# Patient Record
Sex: MALE | Race: WHITE
[De-identification: names, ages, dates, MRNs, and addresses within clinical notes are randomized per-mention and may not be internally consistent; named-entity substitution may affect disease eponyms.]

---

## 2017-01-25 ENCOUNTER — HOSPITAL ENCOUNTER (EMERGENCY)
Dept: HOSPITAL 71 - ER | Age: 32
Discharge: HOME | End: 2017-01-25
Payer: SELF-PAY

## 2017-01-25 DIAGNOSIS — F17.210: ICD-10-CM

## 2017-01-25 DIAGNOSIS — R10.9: Primary | ICD-10-CM

## 2017-01-25 PROCEDURE — 81003 URINALYSIS AUTO W/O SCOPE: CPT

## 2017-01-25 PROCEDURE — 80053 COMPREHEN METABOLIC PANEL: CPT

## 2017-01-25 PROCEDURE — 83690 ASSAY OF LIPASE: CPT

## 2017-01-25 PROCEDURE — 96361 HYDRATE IV INFUSION ADD-ON: CPT

## 2017-01-25 PROCEDURE — 96375 TX/PRO/DX INJ NEW DRUG ADDON: CPT

## 2017-01-25 PROCEDURE — 74177 CT ABD & PELVIS W/CONTRAST: CPT

## 2017-01-25 PROCEDURE — 36415 COLL VENOUS BLD VENIPUNCTURE: CPT

## 2017-01-25 PROCEDURE — 96376 TX/PRO/DX INJ SAME DRUG ADON: CPT

## 2017-01-25 PROCEDURE — 85025 COMPLETE CBC W/AUTO DIFF WBC: CPT

## 2017-01-25 PROCEDURE — 96374 THER/PROPH/DIAG INJ IV PUSH: CPT

## 2019-09-08 ENCOUNTER — HOSPITAL ENCOUNTER (OUTPATIENT)
Dept: URGENT CARE | Facility: CLINIC | Age: 34
Discharge: HOME | End: 2019-09-08
Attending: EMERGENCY MEDICINE

## 2022-03-19 ENCOUNTER — APPOINTMENT (OUTPATIENT)
Dept: CT IMAGING | Facility: HOSPITAL | Age: 37
End: 2022-03-19

## 2022-03-19 ENCOUNTER — HOSPITAL ENCOUNTER (EMERGENCY)
Facility: HOSPITAL | Age: 37
Discharge: HOME | End: 2022-03-19
Attending: EMERGENCY MEDICINE | Admitting: EMERGENCY MEDICINE

## 2022-03-19 VITALS
HEIGHT: 67 IN | BODY MASS INDEX: 34.53 KG/M2 | OXYGEN SATURATION: 96 % | TEMPERATURE: 97.9 F | DIASTOLIC BLOOD PRESSURE: 73 MMHG | SYSTOLIC BLOOD PRESSURE: 109 MMHG | WEIGHT: 220 LBS | RESPIRATION RATE: 18 BRPM | HEART RATE: 73 BPM

## 2022-03-19 DIAGNOSIS — L03.311 ABDOMINAL WALL CELLULITIS: Primary | ICD-10-CM

## 2022-03-19 LAB
ALBUMIN SERPL-MCNC: 4.9 G/DL (ref 3.5–5.2)
ALBUMIN/GLOB SERPL: 1.8 G/DL
ALP SERPL-CCNC: 79 U/L (ref 39–117)
ALT SERPL W P-5'-P-CCNC: 27 U/L (ref 1–41)
ANION GAP SERPL CALCULATED.3IONS-SCNC: 10.5 MMOL/L (ref 5–15)
AST SERPL-CCNC: 23 U/L (ref 1–40)
BASOPHILS # BLD AUTO: 0.04 10*3/MM3 (ref 0–0.2)
BASOPHILS NFR BLD AUTO: 0.4 % (ref 0–1.5)
BILIRUB SERPL-MCNC: 0.4 MG/DL (ref 0–1.2)
BILIRUB UR QL STRIP: NEGATIVE
BUN SERPL-MCNC: 11 MG/DL (ref 6–20)
BUN/CREAT SERPL: 11.5 (ref 7–25)
CALCIUM SPEC-SCNC: 9.4 MG/DL (ref 8.6–10.5)
CHLORIDE SERPL-SCNC: 103 MMOL/L (ref 98–107)
CLARITY UR: CLEAR
CO2 SERPL-SCNC: 26.5 MMOL/L (ref 22–29)
COLOR UR: YELLOW
CREAT SERPL-MCNC: 0.96 MG/DL (ref 0.76–1.27)
DEPRECATED RDW RBC AUTO: 41.5 FL (ref 37–54)
EGFRCR SERPLBLD CKD-EPI 2021: 105.1 ML/MIN/1.73
EOSINOPHIL # BLD AUTO: 0.18 10*3/MM3 (ref 0–0.4)
EOSINOPHIL NFR BLD AUTO: 1.6 % (ref 0.3–6.2)
ERYTHROCYTE [DISTWIDTH] IN BLOOD BY AUTOMATED COUNT: 12.6 % (ref 12.3–15.4)
GLOBULIN UR ELPH-MCNC: 2.7 GM/DL
GLUCOSE SERPL-MCNC: 87 MG/DL (ref 65–99)
GLUCOSE UR STRIP-MCNC: NEGATIVE MG/DL
HCT VFR BLD AUTO: 45 % (ref 37.5–51)
HGB BLD-MCNC: 15.5 G/DL (ref 13–17.7)
HGB UR QL STRIP.AUTO: NEGATIVE
HOLD SPECIMEN: NORMAL
HOLD SPECIMEN: NORMAL
IMM GRANULOCYTES # BLD AUTO: 0.03 10*3/MM3 (ref 0–0.05)
IMM GRANULOCYTES NFR BLD AUTO: 0.3 % (ref 0–0.5)
KETONES UR QL STRIP: NEGATIVE
LEUKOCYTE ESTERASE UR QL STRIP.AUTO: NEGATIVE
LIPASE SERPL-CCNC: 91 U/L (ref 13–60)
LYMPHOCYTES # BLD AUTO: 2.76 10*3/MM3 (ref 0.7–3.1)
LYMPHOCYTES NFR BLD AUTO: 24.5 % (ref 19.6–45.3)
MCH RBC QN AUTO: 31 PG (ref 26.6–33)
MCHC RBC AUTO-ENTMCNC: 34.4 G/DL (ref 31.5–35.7)
MCV RBC AUTO: 90 FL (ref 79–97)
MONOCYTES # BLD AUTO: 0.85 10*3/MM3 (ref 0.1–0.9)
MONOCYTES NFR BLD AUTO: 7.5 % (ref 5–12)
NEUTROPHILS NFR BLD AUTO: 65.7 % (ref 42.7–76)
NEUTROPHILS NFR BLD AUTO: 7.41 10*3/MM3 (ref 1.7–7)
NITRITE UR QL STRIP: NEGATIVE
NRBC BLD AUTO-RTO: 0 /100 WBC (ref 0–0.2)
PH UR STRIP.AUTO: 6 [PH] (ref 5–8)
PLATELET # BLD AUTO: 326 10*3/MM3 (ref 140–450)
PMV BLD AUTO: 9.4 FL (ref 6–12)
POTASSIUM SERPL-SCNC: 3.6 MMOL/L (ref 3.5–5.2)
PROT SERPL-MCNC: 7.6 G/DL (ref 6–8.5)
PROT UR QL STRIP: NEGATIVE
RBC # BLD AUTO: 5 10*6/MM3 (ref 4.14–5.8)
SODIUM SERPL-SCNC: 140 MMOL/L (ref 136–145)
SP GR UR STRIP: 1.02 (ref 1–1.03)
UROBILINOGEN UR QL STRIP: NORMAL
WBC NRBC COR # BLD: 11.27 10*3/MM3 (ref 3.4–10.8)
WHOLE BLOOD HOLD SPECIMEN: NORMAL
WHOLE BLOOD HOLD SPECIMEN: NORMAL

## 2022-03-19 PROCEDURE — 85025 COMPLETE CBC W/AUTO DIFF WBC: CPT | Performed by: EMERGENCY MEDICINE

## 2022-03-19 PROCEDURE — 96365 THER/PROPH/DIAG IV INF INIT: CPT

## 2022-03-19 PROCEDURE — 25010000002 ONDANSETRON PER 1 MG: Performed by: REGISTERED NURSE

## 2022-03-19 PROCEDURE — 81003 URINALYSIS AUTO W/O SCOPE: CPT | Performed by: EMERGENCY MEDICINE

## 2022-03-19 PROCEDURE — 96375 TX/PRO/DX INJ NEW DRUG ADDON: CPT

## 2022-03-19 PROCEDURE — 36415 COLL VENOUS BLD VENIPUNCTURE: CPT | Performed by: EMERGENCY MEDICINE

## 2022-03-19 PROCEDURE — 74177 CT ABD & PELVIS W/CONTRAST: CPT

## 2022-03-19 PROCEDURE — 0 IOPAMIDOL PER 1 ML: Performed by: EMERGENCY MEDICINE

## 2022-03-19 PROCEDURE — 25010000002 KETOROLAC TROMETHAMINE PER 15 MG: Performed by: REGISTERED NURSE

## 2022-03-19 PROCEDURE — 83690 ASSAY OF LIPASE: CPT | Performed by: EMERGENCY MEDICINE

## 2022-03-19 PROCEDURE — 99283 EMERGENCY DEPT VISIT LOW MDM: CPT

## 2022-03-19 PROCEDURE — 80053 COMPREHEN METABOLIC PANEL: CPT | Performed by: EMERGENCY MEDICINE

## 2022-03-19 PROCEDURE — 25010000002 CEFTRIAXONE PER 250 MG: Performed by: REGISTERED NURSE

## 2022-03-19 RX ORDER — ONDANSETRON 2 MG/ML
4 INJECTION INTRAMUSCULAR; INTRAVENOUS ONCE
Status: COMPLETED | OUTPATIENT
Start: 2022-03-19 | End: 2022-03-19

## 2022-03-19 RX ORDER — SODIUM CHLORIDE 0.9 % (FLUSH) 0.9 %
10 SYRINGE (ML) INJECTION AS NEEDED
Status: DISCONTINUED | OUTPATIENT
Start: 2022-03-19 | End: 2022-03-19 | Stop reason: HOSPADM

## 2022-03-19 RX ORDER — CEPHALEXIN 500 MG/1
500 CAPSULE ORAL 4 TIMES DAILY
Qty: 40 CAPSULE | Refills: 0 | Status: SHIPPED | OUTPATIENT
Start: 2022-03-19 | End: 2022-03-29

## 2022-03-19 RX ORDER — KETOROLAC TROMETHAMINE 15 MG/ML
15 INJECTION, SOLUTION INTRAMUSCULAR; INTRAVENOUS ONCE
Status: COMPLETED | OUTPATIENT
Start: 2022-03-19 | End: 2022-03-19

## 2022-03-19 RX ORDER — ONDANSETRON 4 MG/1
4 TABLET, ORALLY DISINTEGRATING ORAL EVERY 8 HOURS PRN
Qty: 30 TABLET | Refills: 0 | Status: SHIPPED | OUTPATIENT
Start: 2022-03-19 | End: 2022-03-29

## 2022-03-19 RX ORDER — CEFTRIAXONE SODIUM 1 G/50ML
1 INJECTION, SOLUTION INTRAVENOUS ONCE
Status: COMPLETED | OUTPATIENT
Start: 2022-03-19 | End: 2022-03-19

## 2022-03-19 RX ADMIN — ONDANSETRON 4 MG: 2 INJECTION INTRAMUSCULAR; INTRAVENOUS at 16:55

## 2022-03-19 RX ADMIN — SODIUM CHLORIDE 1000 ML: 9 INJECTION, SOLUTION INTRAVENOUS at 16:28

## 2022-03-19 RX ADMIN — CEFTRIAXONE SODIUM 1 G: 1 INJECTION, SOLUTION INTRAVENOUS at 17:34

## 2022-03-19 RX ADMIN — KETOROLAC TROMETHAMINE 15 MG: 15 INJECTION, SOLUTION INTRAMUSCULAR; INTRAVENOUS at 16:29

## 2022-03-19 RX ADMIN — IOPAMIDOL 100 ML: 755 INJECTION, SOLUTION INTRAVENOUS at 16:46

## 2022-03-19 NOTE — ED PROVIDER NOTES
Time: 3:24 PM EDT  Arrived by: THA  Chief Complaint: Abdominal pain  History provided by: Patient    History of Present Illness:  Patient is a 36 y.o. year old male that presents to the emergency department with complaint of abdominal pain, redness, increased warmth, and a knot just above the umbilicus.  He states that started 2 days ago and has progressively worsened.  He denies nausea/vomiting, constipation/diarrhea or fever/chills.       used: No    Abdominal Pain  Pain location:  Periumbilical  Pain quality: sharp and throbbing    Pain radiates to:  Does not radiate  Pain severity:  Moderate  Onset quality:  Sudden  Duration:  2 days  Timing:  Constant  Progression:  Worsening  Chronicity:  New  Relieved by:  Nothing  Worsened by:  Nothing  Ineffective treatments:  None tried  Associated symptoms: no chest pain, no chills, no constipation, no cough, no diarrhea, no dysuria, no fever, no hematochezia, no hematuria, no nausea, no shortness of breath, no sore throat and no vomiting            Similar Symptoms Previously: No  Recently seen: No      Patient Care Team  Primary Care Provider: None listed    Past Medical History:     No Known Allergies  History reviewed. No pertinent past medical history.  Past Surgical History:   Procedure Laterality Date   • SPINE SURGERY       History reviewed. No pertinent family history.    Home Medications:  Prior to Admission medications    Not on File        Social History:   PT  reports that he has been smoking cigarettes. He has been smoking about 1.00 pack per day. He does not have any smokeless tobacco history on file. He reports previous alcohol use. No history on file for drug use.    Record Review:  I have reviewed the patient's records in [employer].     Review of Systems  Review of Systems   Constitutional: Negative for chills and fever.   HENT: Negative for congestion, ear pain and sore throat.    Eyes: Negative for pain.   Respiratory: Negative for  "cough, chest tightness and shortness of breath.    Cardiovascular: Negative for chest pain.   Gastrointestinal: Positive for abdominal pain. Negative for blood in stool, constipation, diarrhea, hematochezia, nausea and vomiting.   Genitourinary: Negative for dysuria, flank pain and hematuria.   Musculoskeletal: Negative for joint swelling.   Skin: Negative for pallor.   Neurological: Negative for seizures and headaches.   All other systems reviewed and are negative.       Physical Exam    /73 (BP Location: Right arm, Patient Position: Sitting)   Pulse 73   Temp 97.9 °F (36.6 °C) (Oral)   Resp 18   Ht 170.2 cm (67\")   Wt 99.8 kg (220 lb)   SpO2 96%   BMI 34.46 kg/m²     Physical Exam  Vitals and nursing note reviewed.   Constitutional:       General: He is not in acute distress.     Appearance: Normal appearance. He is obese. He is not toxic-appearing.   HENT:      Head: Normocephalic and atraumatic.      Mouth/Throat:      Mouth: Mucous membranes are moist.   Cardiovascular:      Rate and Rhythm: Normal rate and regular rhythm.      Pulses:           Radial pulses are 2+ on the right side and 2+ on the left side.      Heart sounds: Normal heart sounds.   Pulmonary:      Effort: Pulmonary effort is normal. No respiratory distress.      Breath sounds: Normal breath sounds.   Abdominal:      General: Abdomen is protuberant. Bowel sounds are normal.      Palpations: Abdomen is soft.      Tenderness: There is abdominal tenderness in the periumbilical area. There is no right CVA tenderness, left CVA tenderness, guarding or rebound.          Comments: Discrete erythema surrounding umbilicus, increased warmth, palpable painful lump superior to umbilicus   Musculoskeletal:         General: Normal range of motion.      Cervical back: Normal range of motion and neck supple.   Skin:     General: Skin is warm and dry.   Neurological:      Mental Status: He is alert and oriented to person, place, and time. Mental " "status is at baseline.                  ED Course  /73 (BP Location: Right arm, Patient Position: Sitting)   Pulse 73   Temp 97.9 °F (36.6 °C) (Oral)   Resp 18   Ht 170.2 cm (67\")   Wt 99.8 kg (220 lb)   SpO2 96%   BMI 34.46 kg/m²   Results for orders placed or performed during the hospital encounter of 03/19/22   Comprehensive Metabolic Panel    Specimen: Blood   Result Value Ref Range    Glucose 87 65 - 99 mg/dL    BUN 11 6 - 20 mg/dL    Creatinine 0.96 0.76 - 1.27 mg/dL    Sodium 140 136 - 145 mmol/L    Potassium 3.6 3.5 - 5.2 mmol/L    Chloride 103 98 - 107 mmol/L    CO2 26.5 22.0 - 29.0 mmol/L    Calcium 9.4 8.6 - 10.5 mg/dL    Total Protein 7.6 6.0 - 8.5 g/dL    Albumin 4.90 3.50 - 5.20 g/dL    ALT (SGPT) 27 1 - 41 U/L    AST (SGOT) 23 1 - 40 U/L    Alkaline Phosphatase 79 39 - 117 U/L    Total Bilirubin 0.4 0.0 - 1.2 mg/dL    Globulin 2.7 gm/dL    A/G Ratio 1.8 g/dL    BUN/Creatinine Ratio 11.5 7.0 - 25.0    Anion Gap 10.5 5.0 - 15.0 mmol/L    eGFR 105.1 >60.0 mL/min/1.73   Lipase    Specimen: Blood   Result Value Ref Range    Lipase 91 (H) 13 - 60 U/L   Urinalysis With Microscopic If Indicated (No Culture) - Urine, Clean Catch    Specimen: Urine, Clean Catch   Result Value Ref Range    Color, UA Yellow Yellow, Straw    Appearance, UA Clear Clear    pH, UA 6.0 5.0 - 8.0    Specific Gravity, UA 1.017 1.005 - 1.030    Glucose, UA Negative Negative    Ketones, UA Negative Negative    Bilirubin, UA Negative Negative    Blood, UA Negative Negative    Protein, UA Negative Negative    Leuk Esterase, UA Negative Negative    Nitrite, UA Negative Negative    Urobilinogen, UA 1.0 E.U./dL 0.2 - 1.0 E.U./dL   CBC Auto Differential    Specimen: Blood   Result Value Ref Range    WBC 11.27 (H) 3.40 - 10.80 10*3/mm3    RBC 5.00 4.14 - 5.80 10*6/mm3    Hemoglobin 15.5 13.0 - 17.7 g/dL    Hematocrit 45.0 37.5 - 51.0 %    MCV 90.0 79.0 - 97.0 fL    MCH 31.0 26.6 - 33.0 pg    MCHC 34.4 31.5 - 35.7 g/dL    RDW 12.6 " 12.3 - 15.4 %    RDW-SD 41.5 37.0 - 54.0 fl    MPV 9.4 6.0 - 12.0 fL    Platelets 326 140 - 450 10*3/mm3    Neutrophil % 65.7 42.7 - 76.0 %    Lymphocyte % 24.5 19.6 - 45.3 %    Monocyte % 7.5 5.0 - 12.0 %    Eosinophil % 1.6 0.3 - 6.2 %    Basophil % 0.4 0.0 - 1.5 %    Immature Grans % 0.3 0.0 - 0.5 %    Neutrophils, Absolute 7.41 (H) 1.70 - 7.00 10*3/mm3    Lymphocytes, Absolute 2.76 0.70 - 3.10 10*3/mm3    Monocytes, Absolute 0.85 0.10 - 0.90 10*3/mm3    Eosinophils, Absolute 0.18 0.00 - 0.40 10*3/mm3    Basophils, Absolute 0.04 0.00 - 0.20 10*3/mm3    Immature Grans, Absolute 0.03 0.00 - 0.05 10*3/mm3    nRBC 0.0 0.0 - 0.2 /100 WBC   Green Top (Gel)   Result Value Ref Range    Extra Tube Hold for add-ons.    Lavender Top   Result Value Ref Range    Extra Tube hold for add-on    Gold Top - SST   Result Value Ref Range    Extra Tube Hold for add-ons.    Light Blue Top   Result Value Ref Range    Extra Tube hold for add-on      Medications   sodium chloride 0.9 % bolus 1,000 mL (0 mL Intravenous Stopped 3/19/22 1801)   ketorolac (TORADOL) injection 15 mg (15 mg Intravenous Given 3/19/22 1629)   ondansetron (ZOFRAN) injection 4 mg (4 mg Intravenous Given 3/19/22 1655)   iopamidol (ISOVUE-370) 76 % injection 100 mL (100 mL Intravenous Given 3/19/22 1646)   cefTRIAXone (ROCEPHIN) IVPB 1 g (0 g Intravenous Stopped 3/19/22 1804)     CT Abdomen Pelvis With Contrast    Result Date: 3/19/2022  Narrative: PROCEDURE: CT ABDOMEN PELVIS W CONTRAST  COMPARISON: [facility], CT, ABDOMEN/PELVIS WITH CONTRAST, 1/25/2017, 13:12.  INDICATIONS: periumbilical pain, redness, knot  TECHNIQUE: After obtaining the patient's consent, CT images were created with non-ionic intravenous contrast material.   PROTOCOL:   Standard imaging protocol performed    RADIATION:   DLP: 835.8mGy*cm   Automated exposure control was utilized to minimize radiation dose. CONTRAST: 93cc Isovue 370 I.V. LABS:   eGFR: >60ml/min/1.73m2  FINDINGS:   Abdomen:  Clues lung bases are clear.  Liver, spleen, kidneys, adrenal glands, pancreas gallbladder unremarkable.  Bowel loops are nondilated.  Normal appendix.  Skin thickening in the umbilical region.  No drainable fluid collection.  Pelvis:  No pelvic mass or fluid.  No aggressive appearing bone change.      Impression:  Localized skin thickening and enhancement in the umbilical region suggesting cellulitis.  No drainable fluid collection     SALENA WILLAMS MD       Electronically Signed and Approved By: SALENA WILLAMS MD on 3/19/2022 at 17:11               Procedures/EKGs:  Procedures    Medical Decision Making:                     MDM  Number of Diagnoses or Management Options  Abdominal wall cellulitis: new and requires workup  Diagnosis management comments: The patient´s CBC was reviewed and shows no abnormalities of critical concern. Of note, there is no anemia requiring a blood transfusion and the platelet count is acceptable.  White blood cell count was mildly elevated.The patient´s CMP was reviewed and shows no abnormalities of critical concern. Of note, the patient´s sodium and potassium are acceptable. The patient´s liver enzymes are unremarkable. The patient´s renal function (creatinine) is preserved. The patient has a normal anion gap.  CT scan showed cellulitis of abdominal wall surrounding umbilicus, no definitive abscess was identified.  Patient was given IV antibiotics in the ED and prescription for oral antibiotics was sent to his pharmacy.     I have spoken with the patient and explained the patient´s condition, diagnoses and treatment plan based on the information available to me at this time. I have answered the patient's questions and addressed any concerns. The patient has a good  understanding of the diagnosis, condition, and treatment plan as can be expected at this point. The vital signs have been stable. The patient´s condition is stable and appropriate for discharge from the emergency  department.      The patient will pursue further outpatient evaluation with the primary care physician or other designated or consulting physician as outlined in the discharge instructions. They are agreeable to this plan of care and follow-up instructions have been explained in detail. The patient has received these instructions in written format and has expressed an understanding of the discharge instructions. The patient is aware that any significant change in condition or worsening of symptoms should prompt an immediate return to this or the closest emergency department or call to 911.       Amount and/or Complexity of Data Reviewed  Clinical lab tests: ordered and reviewed  Tests in the radiology section of CPT®: ordered and reviewed    Risk of Complications, Morbidity, and/or Mortality  Presenting problems: minimal  Diagnostic procedures: minimal  Management options: minimal    Patient Progress  Patient progress: stable       Final diagnoses:   Abdominal wall cellulitis        Disposition:  ED Disposition     ED Disposition   Discharge    Condition   Stable    Comment   --              Jacinta Gamboa, APRFARHEEN  03/19/22 3258

## 2022-03-19 NOTE — DISCHARGE INSTRUCTIONS
Your CAT scan showed cellulitis of the abdominal wall, there was no abscess visible at this time.  Please monitor the area of concern for development of an abscess.  Take antibiotics as prescribed.  You can take anti-inflammatories as well to reduce inflammation of the area.    Return for worsening symptoms such as development of an abscess that is drainable, fever, worsening redness, swelling, or any other concerning symptoms.

## 2022-07-06 ENCOUNTER — APPOINTMENT (OUTPATIENT)
Dept: GENERAL RADIOLOGY | Facility: HOSPITAL | Age: 37
End: 2022-07-06

## 2022-07-06 ENCOUNTER — HOSPITAL ENCOUNTER (EMERGENCY)
Facility: HOSPITAL | Age: 37
Discharge: HOME | End: 2022-07-06
Attending: EMERGENCY MEDICINE | Admitting: EMERGENCY MEDICINE

## 2022-07-06 VITALS
OXYGEN SATURATION: 98 % | HEART RATE: 91 BPM | BODY MASS INDEX: 37.27 KG/M2 | DIASTOLIC BLOOD PRESSURE: 88 MMHG | HEIGHT: 67 IN | RESPIRATION RATE: 18 BRPM | SYSTOLIC BLOOD PRESSURE: 129 MMHG | WEIGHT: 237.44 LBS | TEMPERATURE: 98 F

## 2022-07-06 DIAGNOSIS — S90.32XA CONTUSION OF LEFT FOOT, INITIAL ENCOUNTER: Primary | ICD-10-CM

## 2022-07-06 PROCEDURE — 73630 X-RAY EXAM OF FOOT: CPT

## 2022-07-06 PROCEDURE — 99283 EMERGENCY DEPT VISIT LOW MDM: CPT

## 2022-07-06 RX ORDER — IBUPROFEN 400 MG/1
800 TABLET ORAL ONCE
Status: COMPLETED | OUTPATIENT
Start: 2022-07-06 | End: 2022-07-06

## 2022-07-06 RX ADMIN — IBUPROFEN 800 MG: 400 TABLET, FILM COATED ORAL at 11:22

## 2022-07-06 NOTE — ED PROVIDER NOTES
Subjective   History of Present Illness  Patient is a 36-year-old male presents today for left foot injury that occurred when he dropped a stack of pallets on his foot this morning while at work.  He complains of significant pain and bruising.  He has not had any medications for his pain.  He denies any other injuries from this incident.  Review of Systems  I performed a 10 point review of systems which was all negative, except for the positives found in the HPI above.  History reviewed. No pertinent past medical history.    No Known Allergies    Past Surgical History:   Procedure Laterality Date   • SPINE SURGERY         History reviewed. No pertinent family history.    Social History     Socioeconomic History   • Marital status: Single   Tobacco Use   • Smoking status: Current Every Day Smoker     Packs/day: 1.00     Types: Cigarettes   • Smokeless tobacco: Never Used   Substance and Sexual Activity   • Alcohol use: Not Currently     Comment: social   • Drug use: Never   • Sexual activity: Defer           Objective   Physical Exam    General: Awake alert and in no acute distress     HEENT: Head normocephalic atraumatic, eyes PERRLA EOMI, nose normal, oropharynx normal.     Neck: Supple full range of motion, no meningismus, no lymphadenopathy     Heart: Regular rate and rhythm, no murmurs or rubs, 2+ radial pulses bilaterally     Lungs: Clear to auscultation bilaterally without wheezes or crackles, no respiratory distress     Back exam:  No L-spine tenderness.  No rash.     Skin: Warm, dry, no rash     Musculoskeletal: Normal range of motion, no lower extremity edema, ecchymosis noted to lateral side of left foot extending to base of small toe with mild swelling, 2+ DP pulses bilaterally     Neurologic: Oriented x3, no motor deficits no sensory deficits     Psychiatric: Mood appears stable, no psychosis  Procedures           ED Course                                           MDM  Number of Diagnoses or Management  Options  Contusion of left foot, initial encounter  Diagnosis management comments: No acute findings on x-ray.  Patient was fitted with an Ace wrap and postop shoe and advised to follow-up with work well ASAP. I do not believe that the patient has an acute emergency medical condition requiring additional emergency management at this time. The patient is currently stable for outpatient treatment and continuation of care. Important signs and symptoms that would warrant return to the emergency department were reviewed. The patient was provided the opportunity to ask questions. All questions were addressed and the patient was discharged from the ED. The patient demonstrated understanding and agreed to plan       Amount and/or Complexity of Data Reviewed  Tests in the radiology section of CPT®: reviewed and ordered    Risk of Complications, Morbidity, and/or Mortality  Presenting problems: minimal  Diagnostic procedures: minimal  Management options: minimal    Patient Progress  Patient progress: stable      Final diagnoses:   Contusion of left foot, initial encounter       ED Disposition  ED Disposition     ED Disposition   Discharge    Condition   Stable    Comment   --             Provider, No Known  [facility] [id number]    Call   As needed    [facility]  [address]  [facility] [address] [phone]  Call   Call today for follow-up appointment         Medication List      No changes were made to your prescriptions during this visit.          Jacinta Gamboa, APRN  07/06/22 9148

## 2022-07-06 NOTE — DISCHARGE INSTRUCTIONS
Elevate your foot.  Apply ice several times per day.  Take Tylenol and/or ibuprofen as needed for pain.    Call work well today to arrange for follow-up appointment.    Return for worsening symptoms or new concerns.

## 2023-04-21 ENCOUNTER — HOSPITAL ENCOUNTER (EMERGENCY)
Facility: HOSPITAL | Age: 38
Discharge: HOME | End: 2023-04-21
Attending: EMERGENCY MEDICINE
Payer: COMMERCIAL

## 2023-04-21 ENCOUNTER — APPOINTMENT (OUTPATIENT)
Dept: CT IMAGING | Facility: HOSPITAL | Age: 38
End: 2023-04-21
Payer: COMMERCIAL

## 2023-04-21 VITALS
HEART RATE: 80 BPM | WEIGHT: 239.42 LBS | HEIGHT: 67 IN | SYSTOLIC BLOOD PRESSURE: 111 MMHG | OXYGEN SATURATION: 94 % | BODY MASS INDEX: 37.58 KG/M2 | RESPIRATION RATE: 18 BRPM | DIASTOLIC BLOOD PRESSURE: 83 MMHG | TEMPERATURE: 98 F

## 2023-04-21 DIAGNOSIS — T81.41XA INFECTION OF SUPERFICIAL INCISIONAL SURGICAL SITE AFTER PROCEDURE, INITIAL ENCOUNTER: Primary | ICD-10-CM

## 2023-04-21 LAB
ALBUMIN SERPL-MCNC: 4.4 G/DL (ref 3.5–5.2)
ALBUMIN/GLOB SERPL: 1.5 G/DL
ALP SERPL-CCNC: 80 U/L (ref 39–117)
ALT SERPL W P-5'-P-CCNC: 21 U/L (ref 1–41)
ANION GAP SERPL CALCULATED.3IONS-SCNC: 11.2 MMOL/L (ref 5–15)
AST SERPL-CCNC: 19 U/L (ref 1–40)
BASOPHILS # BLD AUTO: 0.03 10*3/MM3 (ref 0–0.2)
BASOPHILS NFR BLD AUTO: 0.3 % (ref 0–1.5)
BILIRUB SERPL-MCNC: 0.4 MG/DL (ref 0–1.2)
BILIRUB UR QL STRIP: NEGATIVE
BUN SERPL-MCNC: 9 MG/DL (ref 6–20)
BUN/CREAT SERPL: 11.1 (ref 7–25)
CALCIUM SPEC-SCNC: 8.9 MG/DL (ref 8.6–10.5)
CHLORIDE SERPL-SCNC: 103 MMOL/L (ref 98–107)
CLARITY UR: CLEAR
CO2 SERPL-SCNC: 21.8 MMOL/L (ref 22–29)
COLOR UR: YELLOW
CREAT SERPL-MCNC: 0.81 MG/DL (ref 0.76–1.27)
DEPRECATED RDW RBC AUTO: 40.3 FL (ref 37–54)
EGFRCR SERPLBLD CKD-EPI 2021: 116.5 ML/MIN/1.73
EOSINOPHIL # BLD AUTO: 0.2 10*3/MM3 (ref 0–0.4)
EOSINOPHIL NFR BLD AUTO: 1.7 % (ref 0.3–6.2)
ERYTHROCYTE [DISTWIDTH] IN BLOOD BY AUTOMATED COUNT: 12.4 % (ref 12.3–15.4)
GLOBULIN UR ELPH-MCNC: 3 GM/DL
GLUCOSE SERPL-MCNC: 103 MG/DL (ref 65–99)
GLUCOSE UR STRIP-MCNC: NEGATIVE MG/DL
HCT VFR BLD AUTO: 41 % (ref 37.5–51)
HGB BLD-MCNC: 14.7 G/DL (ref 13–17.7)
HGB UR QL STRIP.AUTO: NEGATIVE
HOLD SPECIMEN: NORMAL
HOLD SPECIMEN: NORMAL
IMM GRANULOCYTES # BLD AUTO: 0.03 10*3/MM3 (ref 0–0.05)
IMM GRANULOCYTES NFR BLD AUTO: 0.3 % (ref 0–0.5)
KETONES UR QL STRIP: NEGATIVE
LEUKOCYTE ESTERASE UR QL STRIP.AUTO: NEGATIVE
LIPASE SERPL-CCNC: 103 U/L (ref 13–60)
LYMPHOCYTES # BLD AUTO: 2.09 10*3/MM3 (ref 0.7–3.1)
LYMPHOCYTES NFR BLD AUTO: 18.1 % (ref 19.6–45.3)
MCH RBC QN AUTO: 31.5 PG (ref 26.6–33)
MCHC RBC AUTO-ENTMCNC: 35.9 G/DL (ref 31.5–35.7)
MCV RBC AUTO: 88 FL (ref 79–97)
MONOCYTES # BLD AUTO: 0.94 10*3/MM3 (ref 0.1–0.9)
MONOCYTES NFR BLD AUTO: 8.2 % (ref 5–12)
NEUTROPHILS NFR BLD AUTO: 71.4 % (ref 42.7–76)
NEUTROPHILS NFR BLD AUTO: 8.23 10*3/MM3 (ref 1.7–7)
NITRITE UR QL STRIP: NEGATIVE
NRBC BLD AUTO-RTO: 0 /100 WBC (ref 0–0.2)
PH UR STRIP.AUTO: 7.5 [PH] (ref 5–8)
PLATELET # BLD AUTO: 294 10*3/MM3 (ref 140–450)
PMV BLD AUTO: 9.5 FL (ref 6–12)
POTASSIUM SERPL-SCNC: 3.7 MMOL/L (ref 3.5–5.2)
PROT SERPL-MCNC: 7.4 G/DL (ref 6–8.5)
PROT UR QL STRIP: NEGATIVE
RBC # BLD AUTO: 4.66 10*6/MM3 (ref 4.14–5.8)
SODIUM SERPL-SCNC: 136 MMOL/L (ref 136–145)
SP GR UR STRIP: 1.01 (ref 1–1.03)
UROBILINOGEN UR QL STRIP: NORMAL
WBC NRBC COR # BLD: 11.52 10*3/MM3 (ref 3.4–10.8)
WHOLE BLOOD HOLD COAG: NORMAL
WHOLE BLOOD HOLD SPECIMEN: NORMAL

## 2023-04-21 PROCEDURE — 81003 URINALYSIS AUTO W/O SCOPE: CPT | Performed by: EMERGENCY MEDICINE

## 2023-04-21 PROCEDURE — 36415 COLL VENOUS BLD VENIPUNCTURE: CPT

## 2023-04-21 PROCEDURE — 83690 ASSAY OF LIPASE: CPT

## 2023-04-21 PROCEDURE — 99282 EMERGENCY DEPT VISIT SF MDM: CPT

## 2023-04-21 PROCEDURE — 96374 THER/PROPH/DIAG INJ IV PUSH: CPT

## 2023-04-21 PROCEDURE — 80053 COMPREHEN METABOLIC PANEL: CPT

## 2023-04-21 PROCEDURE — 85025 COMPLETE CBC W/AUTO DIFF WBC: CPT

## 2023-04-21 PROCEDURE — 87070 CULTURE OTHR SPECIMN AEROBIC: CPT | Performed by: EMERGENCY MEDICINE

## 2023-04-21 PROCEDURE — 25010000002 ONDANSETRON PER 1 MG: Performed by: EMERGENCY MEDICINE

## 2023-04-21 PROCEDURE — 87205 SMEAR GRAM STAIN: CPT | Performed by: EMERGENCY MEDICINE

## 2023-04-21 RX ORDER — KETOROLAC TROMETHAMINE 30 MG/ML
30 INJECTION, SOLUTION INTRAMUSCULAR; INTRAVENOUS ONCE
Status: DISCONTINUED | OUTPATIENT
Start: 2023-04-21 | End: 2023-04-21 | Stop reason: HOSPADM

## 2023-04-21 RX ORDER — SODIUM CHLORIDE 0.9 % (FLUSH) 0.9 %
10 SYRINGE (ML) INJECTION AS NEEDED
Status: DISCONTINUED | OUTPATIENT
Start: 2023-04-21 | End: 2023-04-21 | Stop reason: HOSPADM

## 2023-04-21 RX ORDER — ONDANSETRON 2 MG/ML
4 INJECTION INTRAMUSCULAR; INTRAVENOUS ONCE
Status: COMPLETED | OUTPATIENT
Start: 2023-04-21 | End: 2023-04-21

## 2023-04-21 RX ADMIN — ONDANSETRON 4 MG: 2 INJECTION INTRAMUSCULAR; INTRAVENOUS at 10:50

## 2023-04-21 RX ADMIN — SODIUM CHLORIDE 1000 ML: 9 INJECTION, SOLUTION INTRAVENOUS at 10:50

## 2023-04-21 NOTE — ED PROVIDER NOTES
Time: 10:11 AM EDT  Date of encounter:  4/21/2023  Independent Historian/Clinical History and Information was obtained by:   Patient  Chief Complaint: post-op problem    History is limited by: N/A    History of Present Illness:  Patient is a 37 y.o. year old male who presents to the emergency department for evaluation of post-op problem. Patient c/o abdominal pain, states had umbilical hernia repair three weeks ago with umbilical cyst removal at [facility] with Dr. Young, laparoscopic umbilical hernia repair with mesh. Patient states he has been having pain and discharge from his belly button.       History provided by:  Patient   used: No        Patient Care Team  Primary Care Provider: Provider, No Known    Past Medical History:     No Known Allergies  History reviewed. No pertinent past medical history.  Past Surgical History:   Procedure Laterality Date   • HERNIA REPAIR     • SPINE SURGERY       History reviewed. No pertinent family history.    Home Medications:  Prior to Admission medications    Medication Sig Start Date End Date Taking? Authorizing Provider   diclofenac (VOLTAREN) 50 MG EC tablet Take 1 tablet by mouth 3 (Three) Times a Day. 3/18/23   Minh Albarran PA        Social History:   Social History     Tobacco Use   • Smoking status: Every Day     Packs/day: 0.50     Types: Cigarettes   • Smokeless tobacco: Never   Substance Use Topics   • Alcohol use: Not Currently     Comment: social   • Drug use: Never         Review of Systems:  Review of Systems   Constitutional: Negative for activity change, chills, fatigue and unexpected weight change.   HENT: Negative for congestion, sinus pressure, sore throat and trouble swallowing.    Eyes: Negative for pain, discharge, redness and visual disturbance.   Respiratory: Negative for cough, chest tightness, shortness of breath and wheezing.    Cardiovascular: Negative for chest pain and palpitations.   Gastrointestinal:  "Positive for abdominal pain. Negative for diarrhea, nausea and vomiting.   Endocrine: Negative for cold intolerance and polydipsia.   Genitourinary: Positive for difficulty urinating. Negative for dysuria, frequency, hematuria and urgency.   Musculoskeletal: Negative for arthralgias, joint swelling, neck pain and neck stiffness.   Skin: Negative for color change and rash.   Allergic/Immunologic: Negative for environmental allergies and immunocompromised state.   Neurological: Negative for dizziness, weakness and light-headedness.   Hematological: Does not bruise/bleed easily.   Psychiatric/Behavioral: Negative for agitation, confusion, dysphoric mood and suicidal ideas.        Physical Exam:  /83   Pulse 80   Temp 98 °F (36.7 °C) (Oral)   Resp 18   Ht 170.2 cm (67\")   Wt 109 kg (239 lb 6.7 oz)   SpO2 94%   BMI 37.50 kg/m²     Physical Exam  Vitals and nursing note reviewed.   Constitutional:       General: He is not in acute distress.  HENT:      Head: Normocephalic and atraumatic.      Right Ear: External ear normal.      Left Ear: External ear normal.      Nose: Nose normal.      Mouth/Throat:      Mouth: Mucous membranes are moist.   Eyes:      Extraocular Movements: Extraocular movements intact.      Conjunctiva/sclera: Conjunctivae normal.      Pupils: Pupils are equal, round, and reactive to light.   Cardiovascular:      Rate and Rhythm: Normal rate and regular rhythm.      Pulses: Normal pulses.      Heart sounds: Normal heart sounds.   Pulmonary:      Effort: Pulmonary effort is normal.      Breath sounds: Normal breath sounds.   Abdominal:      General: Bowel sounds are normal. There is no distension.      Palpations: Abdomen is soft.      Tenderness: There is abdominal tenderness in the periumbilical area.      Comments: Yellow drainage from umbilicus   Musculoskeletal:         General: Normal range of motion.      Cervical back: Neck supple.   Neurological:      Mental Status: He is alert and " oriented to person, place, and time.   Psychiatric:         Mood and Affect: Mood normal.         Behavior: Behavior normal.                  Procedures:  Procedures      Medical Decision Making:      Comorbidities that affect care:    None    External Notes reviewed:    Previous Clinic Note: Previous lab and radiological studies      The following orders were placed and all results were independently analyzed by me:  Orders Placed This Encounter   Procedures   • Wound Culture - Surgical Site, Abdominal Wall   • [place] Draw   • Comprehensive Metabolic Panel   • Lipase   • Urinalysis With Microscopic If Indicated (No Culture) - Urine, Clean Catch   • CBC Auto Differential   • Undress & Gown   • CBC & Differential   • Green Top (Gel)   • Lavender Top   • Gold Top - SST   • Light Blue Top       Medications Given in the Emergency Department:  Medications   sodium chloride 0.9 % bolus 1,000 mL (0 mL Intravenous Stopped 4/21/23 1257)   ondansetron (ZOFRAN) injection 4 mg (4 mg Intravenous Given 4/21/23 1050)        ED Course:         Labs:    Lab Results (last 24 hours)     Procedure Component Value Units Date/Time    Urinalysis With Microscopic If Indicated (No Culture) - Urine, Clean Catch [[id number]]  (Normal) Collected: 04/21/23 1029    Specimen: Urine, Clean Catch Updated: 04/21/23 1044     Color, UA Yellow     Appearance, UA Clear     pH, UA 7.5     Specific Gravity, UA 1.007     Glucose, UA Negative     Ketones, UA Negative     Bilirubin, UA Negative     Blood, UA Negative     Protein, UA Negative     Leuk Esterase, UA Negative     Nitrite, UA Negative     Urobilinogen, UA 0.2 E.U./dL    Narrative:      Urine microscopic not indicated.    Wound Culture - Surgical Site, Abdominal Wall [[id number]] Collected: 04/21/23 1030    Specimen: Surgical Site from Abdominal Wall Updated: 04/21/23 1136     Gram Stain Few (2+) WBCs seen      Rare (1+) Gram positive cocci in pairs           Imaging:    No results found.      No  Radiology Exams Resulted Within Past 24 Hours      Differential Diagnosis and Discussion:    Abdominal Pain: Based on the patient's signs and symptoms, I considered abdominal aortic aneurysm, small bowel obstruction, pancreatitis, acute cholecystitis, acute appendecitis, peptic ulcer disease, gastritis, colitis, endocrine disorders, irritable bowel syndrome and other differential diagnosis an etiology of the patient's abdominal pain.    All labs were reviewed and interpreted by me.    MDM  Number of Diagnoses or Management Options  Infection of superficial incisional surgical site after procedure, initial encounter  Diagnosis management comments: The patient eloped from the emergency department prior to CT scan       Amount and/or Complexity of Data Reviewed  Clinical lab tests: reviewed             Patient Care Considerations:    CT ABDOMEN AND PELVIS: I considered ordering a CT scan of the abdomen and pelvis however the patient eloped waiting for CT      Consultants/Shared Management Plan:    None    Social Determinants of Health:    Patient is independent, reliable, and has access to care.       Disposition and Care Coordination:    Eloped: This patient has left the emergency department or waiting room with no communication to myself, nursing or administrative staff. There was no opportunity to discuss the patient's decision to leave, provide medical advice or discuss alternatives to. The staff has made efforts to locate patient without success.        Final diagnoses:   Infection of superficial incisional surgical site after procedure, initial encounter        ED Disposition     ED Disposition   Eloped    Condition   --    Comment   --             This medical record created using voice recognition software.        Documentation assistance provided by Sussy Rodriguez acting as scribe for No att. providers found. Information recorded by the scribe was done at my direction and has been verified and validated  by me.          Sussy Rodriguez  04/21/23 1020       Russell Gandhi DO  04/22/23 0971

## 2023-04-21 NOTE — ED NOTES
Pt given a urinal and informed urine sample is needed. Pt states he's been having retention all morning and drank plenty of fluids. Pt told to try for a urine sample and to hit call light if able to urinate. RN informed of urine retention issue.

## 2023-04-23 ENCOUNTER — HOSPITAL ENCOUNTER (EMERGENCY)
Facility: HOSPITAL | Age: 38
Discharge: HOME | End: 2023-04-23
Attending: STUDENT IN AN ORGANIZED HEALTH CARE EDUCATION/TRAINING PROGRAM | Admitting: STUDENT IN AN ORGANIZED HEALTH CARE EDUCATION/TRAINING PROGRAM
Payer: COMMERCIAL

## 2023-04-23 ENCOUNTER — APPOINTMENT (OUTPATIENT)
Dept: CT IMAGING | Facility: HOSPITAL | Age: 38
End: 2023-04-23
Payer: COMMERCIAL

## 2023-04-23 VITALS
SYSTOLIC BLOOD PRESSURE: 126 MMHG | HEIGHT: 67 IN | WEIGHT: 225 LBS | RESPIRATION RATE: 20 BRPM | DIASTOLIC BLOOD PRESSURE: 100 MMHG | BODY MASS INDEX: 35.31 KG/M2 | OXYGEN SATURATION: 100 % | TEMPERATURE: 97.7 F | HEART RATE: 92 BPM

## 2023-04-23 DIAGNOSIS — R10.9 ABDOMINAL PAIN, UNSPECIFIED ABDOMINAL LOCATION: Primary | ICD-10-CM

## 2023-04-23 LAB
ALBUMIN SERPL-MCNC: 4.4 G/DL (ref 3.5–5.2)
ALBUMIN/GLOB SERPL: 1.5 G/DL
ALP SERPL-CCNC: 88 U/L (ref 39–117)
ALT SERPL W P-5'-P-CCNC: 17 U/L (ref 1–41)
ANION GAP SERPL CALCULATED.3IONS-SCNC: 11.3 MMOL/L (ref 5–15)
AST SERPL-CCNC: 17 U/L (ref 1–40)
BACTERIA SPEC AEROBE CULT: NORMAL
BASOPHILS # BLD AUTO: 0.04 10*3/MM3 (ref 0–0.2)
BASOPHILS NFR BLD AUTO: 0.5 % (ref 0–1.5)
BILIRUB SERPL-MCNC: 0.4 MG/DL (ref 0–1.2)
BILIRUB UR QL STRIP: NEGATIVE
BUN SERPL-MCNC: 8 MG/DL (ref 6–20)
BUN/CREAT SERPL: 9.9 (ref 7–25)
CALCIUM SPEC-SCNC: 9.3 MG/DL (ref 8.6–10.5)
CHLORIDE SERPL-SCNC: 103 MMOL/L (ref 98–107)
CLARITY UR: CLEAR
CO2 SERPL-SCNC: 24.7 MMOL/L (ref 22–29)
COLOR UR: YELLOW
CREAT SERPL-MCNC: 0.81 MG/DL (ref 0.76–1.27)
DEPRECATED RDW RBC AUTO: 38.4 FL (ref 37–54)
EGFRCR SERPLBLD CKD-EPI 2021: 116.5 ML/MIN/1.73
EOSINOPHIL # BLD AUTO: 0.25 10*3/MM3 (ref 0–0.4)
EOSINOPHIL NFR BLD AUTO: 3.4 % (ref 0.3–6.2)
ERYTHROCYTE [DISTWIDTH] IN BLOOD BY AUTOMATED COUNT: 12.1 % (ref 12.3–15.4)
GLOBULIN UR ELPH-MCNC: 3 GM/DL
GLUCOSE SERPL-MCNC: 112 MG/DL (ref 65–99)
GLUCOSE UR STRIP-MCNC: NEGATIVE MG/DL
GRAM STN SPEC: NORMAL
GRAM STN SPEC: NORMAL
HCT VFR BLD AUTO: 40.4 % (ref 37.5–51)
HGB BLD-MCNC: 14.4 G/DL (ref 13–17.7)
HGB UR QL STRIP.AUTO: NEGATIVE
HOLD SPECIMEN: NORMAL
HOLD SPECIMEN: NORMAL
IMM GRANULOCYTES # BLD AUTO: 0.02 10*3/MM3 (ref 0–0.05)
IMM GRANULOCYTES NFR BLD AUTO: 0.3 % (ref 0–0.5)
KETONES UR QL STRIP: NEGATIVE
LEUKOCYTE ESTERASE UR QL STRIP.AUTO: NEGATIVE
LIPASE SERPL-CCNC: 122 U/L (ref 13–60)
LYMPHOCYTES # BLD AUTO: 2.46 10*3/MM3 (ref 0.7–3.1)
LYMPHOCYTES NFR BLD AUTO: 33 % (ref 19.6–45.3)
MCH RBC QN AUTO: 30.6 PG (ref 26.6–33)
MCHC RBC AUTO-ENTMCNC: 35.6 G/DL (ref 31.5–35.7)
MCV RBC AUTO: 86 FL (ref 79–97)
MONOCYTES # BLD AUTO: 0.65 10*3/MM3 (ref 0.1–0.9)
MONOCYTES NFR BLD AUTO: 8.7 % (ref 5–12)
NEUTROPHILS NFR BLD AUTO: 4.04 10*3/MM3 (ref 1.7–7)
NEUTROPHILS NFR BLD AUTO: 54.1 % (ref 42.7–76)
NITRITE UR QL STRIP: NEGATIVE
NRBC BLD AUTO-RTO: 0 /100 WBC (ref 0–0.2)
PH UR STRIP.AUTO: 7 [PH] (ref 5–8)
PLATELET # BLD AUTO: 325 10*3/MM3 (ref 140–450)
PMV BLD AUTO: 9.6 FL (ref 6–12)
POTASSIUM SERPL-SCNC: 3.5 MMOL/L (ref 3.5–5.2)
PROT SERPL-MCNC: 7.4 G/DL (ref 6–8.5)
PROT UR QL STRIP: NEGATIVE
RBC # BLD AUTO: 4.7 10*6/MM3 (ref 4.14–5.8)
SODIUM SERPL-SCNC: 139 MMOL/L (ref 136–145)
SP GR UR STRIP: 1.01 (ref 1–1.03)
UROBILINOGEN UR QL STRIP: NORMAL
WBC NRBC COR # BLD: 7.46 10*3/MM3 (ref 3.4–10.8)
WHOLE BLOOD HOLD COAG: NORMAL
WHOLE BLOOD HOLD SPECIMEN: NORMAL

## 2023-04-23 PROCEDURE — 99283 EMERGENCY DEPT VISIT LOW MDM: CPT

## 2023-04-23 PROCEDURE — 25510000001 IOPAMIDOL PER 1 ML: Performed by: STUDENT IN AN ORGANIZED HEALTH CARE EDUCATION/TRAINING PROGRAM

## 2023-04-23 PROCEDURE — 85025 COMPLETE CBC W/AUTO DIFF WBC: CPT | Performed by: STUDENT IN AN ORGANIZED HEALTH CARE EDUCATION/TRAINING PROGRAM

## 2023-04-23 PROCEDURE — 74177 CT ABD & PELVIS W/CONTRAST: CPT

## 2023-04-23 PROCEDURE — 25010000002 KETOROLAC TROMETHAMINE PER 15 MG

## 2023-04-23 PROCEDURE — 81003 URINALYSIS AUTO W/O SCOPE: CPT | Performed by: STUDENT IN AN ORGANIZED HEALTH CARE EDUCATION/TRAINING PROGRAM

## 2023-04-23 PROCEDURE — 96374 THER/PROPH/DIAG INJ IV PUSH: CPT

## 2023-04-23 PROCEDURE — 80053 COMPREHEN METABOLIC PANEL: CPT | Performed by: STUDENT IN AN ORGANIZED HEALTH CARE EDUCATION/TRAINING PROGRAM

## 2023-04-23 PROCEDURE — 83690 ASSAY OF LIPASE: CPT | Performed by: STUDENT IN AN ORGANIZED HEALTH CARE EDUCATION/TRAINING PROGRAM

## 2023-04-23 RX ORDER — KETOROLAC TROMETHAMINE 15 MG/ML
15 INJECTION, SOLUTION INTRAMUSCULAR; INTRAVENOUS ONCE
Status: COMPLETED | OUTPATIENT
Start: 2023-04-23 | End: 2023-04-23

## 2023-04-23 RX ORDER — SODIUM CHLORIDE 0.9 % (FLUSH) 0.9 %
10 SYRINGE (ML) INJECTION AS NEEDED
Status: DISCONTINUED | OUTPATIENT
Start: 2023-04-23 | End: 2023-04-23 | Stop reason: HOSPADM

## 2023-04-23 RX ADMIN — KETOROLAC TROMETHAMINE 15 MG: 15 INJECTION, SOLUTION INTRAMUSCULAR; INTRAVENOUS at 18:54

## 2023-04-23 RX ADMIN — IOPAMIDOL 100 ML: 755 INJECTION, SOLUTION INTRAVENOUS at 19:25

## 2023-04-23 NOTE — ED PROVIDER NOTES
"Time: 6:20 PM EDT  Date of encounter:  4/23/2023  Independent Historian/Clinical History and Information was obtained by:   Patient  Chief Complaint   Patient presents with   • Abdominal Pain   • Post-op Problem       History is limited by: N/A    History of Present Illness:  Patient is a 37 y.o. year old male who presents to the emergency department for evaluation of abdominal pain.  Patient states he had umbilical hernia repair approximately 3 weeks ago.  Patient states since that time his umbilicus has been draining pustular discharge and blood.  Patient states he has now noticed an area of swelling inside his umbilicus.  Patient states he discussed with his surgeon, Dr. Young who stated if the patient symptoms worsen to go to the emergency department, but he has not yet followed up with him in the office.  Pt states he last saw Dr. Young last Friday. He states that he has had pain since surgery. However, Pt states over the past week he has been getting worsening pain that \"almost knocks him to the ground\" and describes it \"as a stabbing pain.\"      HPI    Patient Care Team  Primary Care Provider: Provider, No Known    Past Medical History:     No Known Allergies  No past medical history on file.  Past Surgical History:   Procedure Laterality Date   • HERNIA REPAIR     • SPINE SURGERY       No family history on file.    Home Medications:  Prior to Admission medications    Medication Sig Start Date End Date Taking? Authorizing Provider   diclofenac (VOLTAREN) 50 MG EC tablet Take 1 tablet by mouth 3 (Three) Times a Day. 3/18/23   Minh Albarran PA        Social History:   Social History     Tobacco Use   • Smoking status: Every Day     Packs/day: 0.50     Types: Cigarettes   • Smokeless tobacco: Never   Substance Use Topics   • Alcohol use: Not Currently     Comment: social   • Drug use: Never         Review of Systems:  Review of Systems   Constitutional: Negative for chills and fever.   HENT: Negative " "for congestion, rhinorrhea and sore throat.    Eyes: Negative for pain and visual disturbance.   Respiratory: Negative for apnea, cough, chest tightness and shortness of breath.    Cardiovascular: Negative for chest pain and palpitations.   Gastrointestinal: Positive for abdominal pain. Negative for diarrhea, nausea and vomiting.   Genitourinary: Negative for difficulty urinating and dysuria.   Musculoskeletal: Negative for joint swelling and myalgias.   Skin: Negative for color change.   Neurological: Negative for seizures and headaches.   Psychiatric/Behavioral: Negative.    All other systems reviewed and are negative.       Physical Exam:  /100 (BP Location: Right arm, Patient Position: Lying)   Pulse 92   Temp 97.7 °F (36.5 °C) (Oral)   Resp 20   Ht 170.2 cm (67\")   Wt 102 kg (225 lb)   SpO2 100%   BMI 35.24 kg/m²     Physical Exam  Constitutional:       Appearance: Normal appearance.   HENT:      Head: Normocephalic and atraumatic.      Nose: Nose normal.      Mouth/Throat:      Mouth: Mucous membranes are moist.   Eyes:      Extraocular Movements: Extraocular movements intact.      Conjunctiva/sclera: Conjunctivae normal.      Pupils: Pupils are equal, round, and reactive to light.   Cardiovascular:      Rate and Rhythm: Normal rate and regular rhythm.      Pulses: Normal pulses.      Heart sounds: Normal heart sounds.   Pulmonary:      Effort: Pulmonary effort is normal.      Breath sounds: Normal breath sounds. No wheezing.   Abdominal:      General: There is no distension.      Palpations: Abdomen is soft.      Tenderness: There is no abdominal tenderness.      Comments: Abdominal pain around umbilicus, no drainage.  Granulation tissue present in the umbilicus   Musculoskeletal:         General: Normal range of motion.      Cervical back: Normal range of motion and neck supple.      Right lower leg: No edema.      Left lower leg: No edema.   Skin:     General: Skin is warm and dry.      " Capillary Refill: Capillary refill takes less than 2 seconds.      Coloration: Skin is not cyanotic.      Findings: No rash.   Neurological:      General: No focal deficit present.      Mental Status: He is alert and oriented to person, place, and time. Mental status is at baseline.      Cranial Nerves: No cranial nerve deficit.      Sensory: No sensory deficit.      Motor: No weakness.   Psychiatric:         Attention and Perception: Attention and perception normal.         Mood and Affect: Mood normal.         Behavior: Behavior normal.                  Procedures:  Procedures      Medical Decision Making:      Comorbidities that affect care:        External Notes reviewed:    3/10/2023 - Office visit with Dr Young for umbilical pain and umbilical hernia       The following orders were placed and all results were independently analyzed by me:  Orders Placed This Encounter   Procedures   • CT Abdomen Pelvis With Contrast   • [place] Draw   • Comprehensive Metabolic Panel   • Lipase   • Urinalysis With Microscopic If Indicated (No Culture) - Urine, Clean Catch   • CBC Auto Differential   • NPO Diet NPO Type: Strict NPO   • Undress & Gown   • IP General Consult (Use specialty-specific consult if known)   • Insert Peripheral IV   • CBC & Differential   • Green Top (Gel)   • Lavender Top   • Gold Top - SST   • Light Blue Top       Medications Given in the Emergency Department:  Medications   sodium chloride 0.9 % flush 10 mL (has no administration in time range)   ketorolac (TORADOL) injection 15 mg (15 mg Intravenous Given 4/23/23 1854)   iopamidol (ISOVUE-370) 76 % injection 100 mL (100 mL Intravenous Given 4/23/23 1925)        ED Course:    The patient was initially evaluated in the triage area where orders were placed. The patient was later dispositioned by Madisyn Viera MD.      The patient was advised to stay for completion of workup which includes but is not limited to communication of labs and radiological  results, reassessment and plan. The patient was advised that leaving prior to disposition by a provider could result in critical findings that are not communicated to the patient.     ED Course as of 04/23/23 2057   Sun Apr 23, 2023 1824 PROVIDER IN TRIAGE  Patient was evaluated by me in triage, Arthur Hunt PA-C.  Orders were placed and patient is currently awaiting final results and disposition.  [MD]      ED Course User Index  [MD] Arthur Hunt PA-C       Labs:    Lab Results (last 24 hours)     Procedure Component Value Units Date/Time    CBC & Differential [[id number]]  (Abnormal) Collected: 04/23/23 1853    Specimen: Blood Updated: 04/23/23 1902    Narrative:      The following orders were created for panel order CBC & Differential.  Procedure                               Abnormality         Status                     ---------                               -----------         ------                     CBC Auto Differential[[id number]]        Abnormal            Final result                 Please view results for these tests on the individual orders.    Comprehensive Metabolic Panel [[id number]]  (Abnormal) Collected: 04/23/23 1853    Specimen: Blood Updated: 04/23/23 1919     Glucose 112 mg/dL      BUN 8 mg/dL      Creatinine 0.81 mg/dL      Sodium 139 mmol/L      Potassium 3.5 mmol/L      Chloride 103 mmol/L      CO2 24.7 mmol/L      Calcium 9.3 mg/dL      Total Protein 7.4 g/dL      Albumin 4.4 g/dL      ALT (SGPT) 17 U/L      AST (SGOT) 17 U/L      Alkaline Phosphatase 88 U/L      Total Bilirubin 0.4 mg/dL      Globulin 3.0 gm/dL      A/G Ratio 1.5 g/dL      BUN/Creatinine Ratio 9.9     Anion Gap 11.3 mmol/L      eGFR 116.5 mL/min/1.73     Narrative:      GFR Normal >60  Chronic Kidney Disease <60  Kidney Failure <15      Lipase [[id number]]  (Abnormal) Collected: 04/23/23 1853    Specimen: Blood Updated: 04/23/23 1919     Lipase 122 U/L     Urinalysis With Microscopic If Indicated (No Culture) -  Urine, Clean Catch [[id number]]  (Normal) Collected: 04/23/23 1853    Specimen: Urine, Clean Catch Updated: 04/23/23 1906     Color, UA Yellow     Appearance, UA Clear     pH, UA 7.0     Specific Gravity, UA 1.009     Glucose, UA Negative     Ketones, UA Negative     Bilirubin, UA Negative     Blood, UA Negative     Protein, UA Negative     Leuk Esterase, UA Negative     Nitrite, UA Negative     Urobilinogen, UA 1.0 E.U./dL    Narrative:      Urine microscopic not indicated.    CBC Auto Differential [[id number]]  (Abnormal) Collected: 04/23/23 1853    Specimen: Blood Updated: 04/23/23 1902     WBC 7.46 10*3/mm3      RBC 4.70 10*6/mm3      Hemoglobin 14.4 g/dL      Hematocrit 40.4 %      MCV 86.0 fL      MCH 30.6 pg      MCHC 35.6 g/dL      RDW 12.1 %      RDW-SD 38.4 fl      MPV 9.6 fL      Platelets 325 10*3/mm3      Neutrophil % 54.1 %      Lymphocyte % 33.0 %      Monocyte % 8.7 %      Eosinophil % 3.4 %      Basophil % 0.5 %      Immature Grans % 0.3 %      Neutrophils, Absolute 4.04 10*3/mm3      Lymphocytes, Absolute 2.46 10*3/mm3      Monocytes, Absolute 0.65 10*3/mm3      Eosinophils, Absolute 0.25 10*3/mm3      Basophils, Absolute 0.04 10*3/mm3      Immature Grans, Absolute 0.02 10*3/mm3      nRBC 0.0 /100 WBC            Imaging:    CT Abdomen Pelvis With Contrast    Result Date: 4/23/2023  PROCEDURE: CT ABDOMEN PELVIS W CONTRAST  COMPARISON: 3/19/2022.  INDICATIONS: Unspecified abdominal pain; possible umbilical abscess; h/o hernia repair 3 weeks ago.  TECHNIQUE: After obtaining the patient's consent, 567 CT images were created with non-ionic intravenous contrast material.   PROTOCOL:   Standard CT imaging protocol performed.    RADIATION:   Total DLP: 823.8 mGy*cm.   Automated exposure control was utilized to minimize radiation dose. CONTRAST: 100 mL Isovue 370 I.V. LABS:   eGFR: >60 mL/min/1.73m^2  FINDINGS: The patient has undergone interval umbilical hernia repair.  Heterogeneous soft tissue density with  enhancement is seen at the operative site and involving the midline rectus sheath; the central relatively lower density has a CT number ranging between 18 and 26 Hounsfield units (HU) and is nonspecific.  It may represent normal granulation tissue.  Phlegmon or early abscess cannot be excluded.  The area of low attenuation measures about 4.1 x 4.7 x 3 cm in transverse, anteroposterior (AP), and craniocaudal extent, respectively, as seen on image 62 of series 201, image 116 of series 203, image 29 of series 202, and adjacent images.  No ectopic gas collections are seen.  No unintended retained foreign body is identified in this region.  Please correlate clinically.  No recurrent umbilical hernia is suggested.   There are small fat-containing bilateral inguinal hernias.  They do not contain bowel.  There are colonic diverticula without acute diverticulitis.  No acute appendicitis.  No mechanical bowel obstruction.  No pneumoperitoneum.  No portal or mesenteric venous gas.  No gallstones or acute cholecystitis.  No acute pancreatitis.  There may be mild atelectasis in the lung bases.  No acute infiltrate is seen in the partially imaged lung bases.  Degenerative changes involve the imaged spine.  No acute fracture or acute malalignment is identified.         1. There are postoperative changes consistent with recent umbilical hernia repair.  Nonspecific heterogeneous soft tissue density is seen in the area and may represent normal granulation tissue.  Phlegmon or early abscess cannot be excluded.  CT numbers for the central low attenuation range between 18 and 26 Hounsfield units.  No definite CT evidence for recurrent umbilical hernia.  No ectopic gas collections are seen.  No unintended retained foreign body is identified.   2. No other acute findings are appreciated.  3. Please see above comments for further detail.     Please note that portions of this note were completed with a voice recognition program.  ADAMS CHRISTENSEN  KEYSHAWN VINCENT MD       Electronically Signed and Approved By: ADAMS MAHAJAN JR, MD on 4/23/2023 at 19:39                  Differential Diagnosis and Discussion:      Abdominal Pain: Based on the patient's signs and symptoms, I considered abdominal aortic aneurysm, small bowel obstruction, pancreatitis, acute cholecystitis, acute appendecitis, peptic ulcer disease, gastritis, colitis, endocrine disorders, irritable bowel syndrome and other differential diagnosis an etiology of the patient's abdominal pain.    All labs were reviewed and interpreted by me.    MDM     Patient has no leukocytosis nor fever nor other signs of infection.  CT showed small area of fluid that may be a phlegmon less likely an abscess based off patient's presentation.  I spoke with the surgeon who would like to follow-up with him.    Patient Care Considerations:    I considered antibiotics but patient is already taking antibiotics      Consultants/Shared Management Plan:    I spoke with Dr. Young who states he would like to see him in the office no further intervention at this time.    Social Determinants of Health:    Patient is independent, reliable, and has access to care.       Disposition and Care Coordination:    Discharged: The patient is suitable and stable for discharge with no need for consideration of observation or admission.        Final diagnoses:   Abdominal pain, unspecified abdominal location        ED Disposition     ED Disposition   Discharge    Condition   Stable    Comment   --             This medical record created using voice recognition software.    Documentation assistance provided by Tariq Howard, acting as scribe for Madisyn Viera MD. Information recorded by the scribe was done at my direction and has been verified and validated by me.       Tariq Howard  04/23/23 9329       Madisyn Viera MD  04/23/23 2057

## 2023-04-24 NOTE — DISCHARGE INSTRUCTIONS
Follow-up with Dr. Young in the office on Wednesday.  If you develop a fever or vomiting, return to the emergency department.

## 2023-05-16 ENCOUNTER — APPOINTMENT (OUTPATIENT)
Dept: CT IMAGING | Facility: HOSPITAL | Age: 38
End: 2023-05-16
Payer: COMMERCIAL

## 2023-05-16 ENCOUNTER — HOSPITAL ENCOUNTER (EMERGENCY)
Facility: HOSPITAL | Age: 38
Discharge: LEFT BEFORE DISCHARGE | End: 2023-05-16
Attending: EMERGENCY MEDICINE | Admitting: EMERGENCY MEDICINE
Payer: COMMERCIAL

## 2023-05-16 VITALS
BODY MASS INDEX: 36.47 KG/M2 | RESPIRATION RATE: 20 BRPM | WEIGHT: 232.37 LBS | HEART RATE: 61 BPM | OXYGEN SATURATION: 97 % | DIASTOLIC BLOOD PRESSURE: 96 MMHG | SYSTOLIC BLOOD PRESSURE: 107 MMHG | HEIGHT: 67 IN | TEMPERATURE: 98.2 F

## 2023-05-16 DIAGNOSIS — T81.31XA POSTOPERATIVE WOUND DEHISCENCE, INITIAL ENCOUNTER: Primary | ICD-10-CM

## 2023-05-16 LAB
ALBUMIN SERPL-MCNC: 4.6 G/DL (ref 3.5–5.2)
ALBUMIN/GLOB SERPL: 1.4 G/DL
ALP SERPL-CCNC: 107 U/L (ref 39–117)
ALT SERPL W P-5'-P-CCNC: 32 U/L (ref 1–41)
ANION GAP SERPL CALCULATED.3IONS-SCNC: 10.9 MMOL/L (ref 5–15)
AST SERPL-CCNC: 27 U/L (ref 1–40)
BASOPHILS # BLD AUTO: 0.04 10*3/MM3 (ref 0–0.2)
BASOPHILS NFR BLD AUTO: 0.6 % (ref 0–1.5)
BILIRUB SERPL-MCNC: 0.2 MG/DL (ref 0–1.2)
BUN SERPL-MCNC: 9 MG/DL (ref 6–20)
BUN/CREAT SERPL: 11.1 (ref 7–25)
CALCIUM SPEC-SCNC: 9.4 MG/DL (ref 8.6–10.5)
CHLORIDE SERPL-SCNC: 105 MMOL/L (ref 98–107)
CO2 SERPL-SCNC: 24.1 MMOL/L (ref 22–29)
CREAT SERPL-MCNC: 0.81 MG/DL (ref 0.76–1.27)
D-LACTATE SERPL-SCNC: 2 MMOL/L (ref 0.5–2)
DEPRECATED RDW RBC AUTO: 38.8 FL (ref 37–54)
EGFRCR SERPLBLD CKD-EPI 2021: 116.5 ML/MIN/1.73
EOSINOPHIL # BLD AUTO: 0.22 10*3/MM3 (ref 0–0.4)
EOSINOPHIL NFR BLD AUTO: 3.1 % (ref 0.3–6.2)
ERYTHROCYTE [DISTWIDTH] IN BLOOD BY AUTOMATED COUNT: 12 % (ref 12.3–15.4)
GLOBULIN UR ELPH-MCNC: 3.2 GM/DL
GLUCOSE SERPL-MCNC: 120 MG/DL (ref 65–99)
HCT VFR BLD AUTO: 43.1 % (ref 37.5–51)
HGB BLD-MCNC: 15.2 G/DL (ref 13–17.7)
HOLD SPECIMEN: NORMAL
HOLD SPECIMEN: NORMAL
IMM GRANULOCYTES # BLD AUTO: 0.02 10*3/MM3 (ref 0–0.05)
IMM GRANULOCYTES NFR BLD AUTO: 0.3 % (ref 0–0.5)
LYMPHOCYTES # BLD AUTO: 2.14 10*3/MM3 (ref 0.7–3.1)
LYMPHOCYTES NFR BLD AUTO: 30.1 % (ref 19.6–45.3)
MCH RBC QN AUTO: 30.8 PG (ref 26.6–33)
MCHC RBC AUTO-ENTMCNC: 35.3 G/DL (ref 31.5–35.7)
MCV RBC AUTO: 87.4 FL (ref 79–97)
MONOCYTES # BLD AUTO: 0.42 10*3/MM3 (ref 0.1–0.9)
MONOCYTES NFR BLD AUTO: 5.9 % (ref 5–12)
NEUTROPHILS NFR BLD AUTO: 4.27 10*3/MM3 (ref 1.7–7)
NEUTROPHILS NFR BLD AUTO: 60 % (ref 42.7–76)
NRBC BLD AUTO-RTO: 0 /100 WBC (ref 0–0.2)
PLATELET # BLD AUTO: 403 10*3/MM3 (ref 140–450)
PMV BLD AUTO: 9.3 FL (ref 6–12)
POTASSIUM SERPL-SCNC: 4.1 MMOL/L (ref 3.5–5.2)
PROT SERPL-MCNC: 7.8 G/DL (ref 6–8.5)
RBC # BLD AUTO: 4.93 10*6/MM3 (ref 4.14–5.8)
SODIUM SERPL-SCNC: 140 MMOL/L (ref 136–145)
WBC NRBC COR # BLD: 7.11 10*3/MM3 (ref 3.4–10.8)
WHOLE BLOOD HOLD COAG: NORMAL
WHOLE BLOOD HOLD SPECIMEN: NORMAL

## 2023-05-16 PROCEDURE — 74177 CT ABD & PELVIS W/CONTRAST: CPT

## 2023-05-16 PROCEDURE — 99283 EMERGENCY DEPT VISIT LOW MDM: CPT

## 2023-05-16 PROCEDURE — 83605 ASSAY OF LACTIC ACID: CPT | Performed by: EMERGENCY MEDICINE

## 2023-05-16 PROCEDURE — 25510000001 IOPAMIDOL PER 1 ML: Performed by: EMERGENCY MEDICINE

## 2023-05-16 PROCEDURE — 80053 COMPREHEN METABOLIC PANEL: CPT | Performed by: EMERGENCY MEDICINE

## 2023-05-16 PROCEDURE — 85025 COMPLETE CBC W/AUTO DIFF WBC: CPT | Performed by: EMERGENCY MEDICINE

## 2023-05-16 PROCEDURE — 36415 COLL VENOUS BLD VENIPUNCTURE: CPT

## 2023-05-16 PROCEDURE — 87040 BLOOD CULTURE FOR BACTERIA: CPT | Performed by: EMERGENCY MEDICINE

## 2023-05-16 RX ORDER — SODIUM CHLORIDE 0.9 % (FLUSH) 0.9 %
10 SYRINGE (ML) INJECTION AS NEEDED
Status: DISCONTINUED | OUTPATIENT
Start: 2023-05-16 | End: 2023-05-16 | Stop reason: HOSPADM

## 2023-05-16 RX ADMIN — IOPAMIDOL 100 ML: 755 INJECTION, SOLUTION INTRAVENOUS at 14:25

## 2023-05-16 NOTE — SIGNIFICANT NOTE
Wound Eval / Progress Noted    KAY Miles     Patient Name: Zachary Garzon  : 1985  MRN: [id number]  Today's Date: 2023                 Admit Date: 2023    Visit Dx:  No diagnosis found.    There is no problem list on file for this patient.       History reviewed. No pertinent past medical history.     Past Surgical History:   Procedure Laterality Date    HERNIA REPAIR      SPINE SURGERY           Physical Assessment:  Wound 23 141 medial abdomen Incision (Active)   Wound Image    23 141   Dressing Appearance moist drainage;open to air 23 141   Closure Staples 23 141   Base moist;red 23 141   Periwound redness 23 141   Periwound Temperature warm 23 141   Periwound Skin Turgor soft 23 141   Edges open;rolled/closed 23 141   Drainage Characteristics/Odor serosanguineous 23 141   Drainage Amount small 23 141   Care, Wound cleansed with;irrigated with;sterile normal saline 23 141   Dressing Care dressing applied;packed with;gauze, iodoform;gauze, dry;transparent film 23 141   Periwound Care absorptive dressing applied 23 141   Number of Staples Removed 3 23 141        Wound Check / Follow-up:  Patient seen today for wound consult per ED physician. Patient presents with abdominal incision to medial abdomen around umbilicus. He initially had 14 staples intact but three staples were just holding on to one margin and not attached to the other. After discussing with Ed physician and patient, the three loose staples were removed. Open dehiscence noted along upper portion of incision as well as to medial aspect of incision.   Upper opening measures 0.2cm x 0.4cm x 3.2cm  Lower opening measures 1.5cm x 0.2cm x 1.4cm  Entire incision measured 5.5 cm.   Patient states that he went to his surgeons office because it was draining, however his surgeon is on vacation. He states the nurses looked at it and were  going to remove the staples, however because it was opening they didn't want to touch it and said he needed to see a doctor. He denies being referred to a wound physician or any other specific physician.   Patient further states that someone at some point mentioned a wound vac to him. I explained that we would have to removal all staples to see what is going on to know if we could place a wound vac. Explained that he may also just need packing. He has had prior wounds with packing and states it wasn't a good experience so he would prefer to explore other options.   After discussing with Primary RN and including ED physician, will recommend referral to Wound Clinic for further evaluation and management.   At present time, patient is agreeable to allowing packing to wound for drainage management. Iodoform packing strip placed lightly into openings and then secured with dry gauze and transparent drape.     Impression: Surgical incision with dehiscence and delayed closure, recurrent surgical incision to abdomen    Short term goals:  Regain skin integrity.     Cherri Murillo RN    5/16/2023    14:17 EDT

## 2023-05-16 NOTE — ED PROVIDER NOTES
"Time: 11:59 AM EDT  Date of encounter:  5/16/2023  Independent Historian/Clinical History and Information was obtained by:   Patient  Chief Complaint: Wound bleeding    History is limited by: N/A    History of Present Illness:  Patient is a 37 y.o. year old male who presents to the emergency department for evaluation of bleeding from a hernia incision.  The patient had an infraumbilical hernia repair at [facility] and has been seen several times for similar symptoms.  Today presents with possible infection.  The patient has had no fever and no significant pain however he does have some bleeding from the area of the incision.  The patient states that he was told that he needed a doctor to \"check it out\" since his surgeon is on vacation.    HPI    Patient Care Team  Primary Care Provider: Nani Keyes Known    Past Medical History:     No Known Allergies  Past Medical History:   Diagnosis Date   • Hypertension      Past Surgical History:   Procedure Laterality Date   • HERNIA REPAIR     • PILONIDAL CYST / SINUS EXCISION     • SPINE SURGERY       Family History   Problem Relation Age of Onset   • No Known Problems Mother    • No Known Problems Father        Home Medications:  Prior to Admission medications    Medication Sig Start Date End Date Taking? Authorizing Provider   acetaminophen (TYLENOL) 500 MG tablet Take 1 tablet by mouth Every 6 (Six) Hours As Needed for Mild Pain. 5/8/23   Alejandra Welch MD   clindamycin (CLEOCIN) 300 MG capsule Take 1 capsule by mouth 3 (Three) Times a Day. 5/8/23   Alejandra Welch MD   ibuprofen (ADVIL,MOTRIN) 800 MG tablet Take 1 tablet by mouth Every 6 (Six) Hours. 3/31/23   ProviderSydney MD   Lactobacillus (Florajen Acidophilus) capsule Take 1 capsule by mouth 3 (Three) Times a Day. 5/8/23   Alejandra Welch MD   penicillin v potassium (VEETID) 500 MG tablet penicillin V potassium 500 mg tablet   TAKE 1 TABLET BY MOUTH FOUR TIMES DAILY FOR 10 DAYS    " "Provider, Historical, MD        Social History:   Social History     Tobacco Use   • Smoking status: Former     Packs/day: 0.50     Types: Cigarettes   • Smokeless tobacco: Current   Vaping Use   • Vaping Use: Never used   Substance Use Topics   • Alcohol use: Yes     Alcohol/week: 3.0 standard drinks     Types: 3 Cans of beer per week     Comment: social   • Drug use: Never         Review of Systems:  Review of Systems   Constitutional: Negative for chills and fever.   HENT: Negative for congestion, ear pain and sore throat.    Eyes: Negative for pain.   Respiratory: Negative for cough, chest tightness and shortness of breath.    Cardiovascular: Negative for chest pain.   Gastrointestinal: Negative for abdominal pain, diarrhea, nausea and vomiting.   Genitourinary: Negative for flank pain and hematuria.   Musculoskeletal: Negative for joint swelling.   Skin: Negative for pallor.        Bleeding abdominal skin incision   Neurological: Negative for seizures and headaches.   All other systems reviewed and are negative.       Physical Exam:  /96   Pulse 61   Temp 98.2 °F (36.8 °C) (Oral)   Resp 20   Ht 170.2 cm (67\")   Wt 105 kg (232 lb 5.8 oz)   SpO2 97%   BMI 36.39 kg/m²     Physical Exam  Vitals and nursing note reviewed.   Constitutional:       General: He is not in acute distress.     Appearance: Normal appearance. He is not toxic-appearing.   HENT:      Head: Normocephalic and atraumatic.      Mouth/Throat:      Mouth: Mucous membranes are moist.   Eyes:      General: No scleral icterus.  Cardiovascular:      Rate and Rhythm: Normal rate and regular rhythm.      Pulses: Normal pulses.      Heart sounds: Normal heart sounds.   Pulmonary:      Effort: Pulmonary effort is normal. No respiratory distress.      Breath sounds: Normal breath sounds.   Abdominal:      General: Abdomen is flat.      Palpations: Abdomen is soft.      Tenderness: There is no abdominal tenderness.   Musculoskeletal:         " General: Normal range of motion.      Cervical back: Normal range of motion and neck supple.   Skin:     General: Skin is warm and dry.      Comments: There is a stapled incision in the infraumbilical area that has small amount of blood coming from it however there is no sign of erythema or purulence.   Neurological:      Mental Status: He is alert and oriented to person, place, and time. Mental status is at baseline.                  Procedures:  Procedures      Medical Decision Making:      Comorbidities that affect care:    wound issue on abdomen    External Notes reviewed:    Previous Clinic Note: for abdominal wound      The following orders were placed and all results were independently analyzed by me:  Orders Placed This Encounter   Procedures   • Blood Culture - Blood,   • Blood Culture - Blood,   • CT Abdomen Pelvis With Contrast   • Comprehensive Metabolic Panel   • Lactic Acid, Plasma   • [place] Draw   • CBC Auto Differential   • Undress & Gown   • Continuous Pulse Oximetry   • Vital Signs   • CBC & Differential   • Green Top (Gel)   • Lavender Top   • Gold Top - SST   • Light Blue Top       Medications Given in the Emergency Department:  Medications   iopamidol (ISOVUE-370) 76 % injection 100 mL (100 mL Intravenous Given 5/16/23 1425)        ED Course:         Labs:    Lab Results (last 24 hours)     Procedure Component Value Units Date/Time    Wound Culture - Wound, Abdominal Wall [[id number]] Collected: 05/17/23 1557    Specimen: Wound from Abdominal Wall Updated: 05/17/23 2110     Gram Stain Occasional Gram positive cocci in pairs           Imaging:    No Radiology Exams Resulted Within Past 24 Hours      Differential Diagnosis and Discussion:    Abscess: Differential diagnosis for an abscess includes but is not limited to bacterial or fungal infections, foreign body reactions, malignancies, and autoimmune or inflammatory conditions.    All labs were reviewed and interpreted by me.    MDM  Number of  Diagnoses or Management Options  Diagnosis management comments: The patient was seen by the wound care specialist in the emergency department and Dr. Kimi De Luna was consulted who will see him in the office tomorrow.  She requested a CT of the abdomen be performed which was done in the emergency department.       Amount and/or Complexity of Data Reviewed  Clinical lab tests: reviewed             Patient Care Considerations:    all appropriate tests were ordered      Consultants/Shared Management Plan:    Consultant: I have discussed the case with WOund care  who states patient will follow up with Dr De Luna tomorrow    Social Determinants of Health:    Patient is independent, reliable, and has access to care.       Disposition and Care Coordination:    Discharged: The patient is suitable and stable for discharge with no need for consideration of observation or admission.    I have explained discharge medications and the need for follow up with the patient/caretakers. This was also printed in the discharge instructions. Patient was discharged with the following medications and follow up:      Medication List      No changes were made to your prescriptions during this visit.      [facility]  [address]  [facility] [address] [phone]  In 1 day         Final diagnoses:   Postoperative wound dehiscence, initial encounter        ED Disposition     ED Disposition   AMA    Condition   --    Comment   Patient leaving due to . He stated he needs to go  his kids.             This medical record created using voice recognition software.           Russell Gandhi, DO  05/18/23 6579

## 2023-05-17 ENCOUNTER — OFFICE VISIT (OUTPATIENT)
Dept: WOUND CARE | Facility: HOSPITAL | Age: 38
End: 2023-05-17
Payer: COMMERCIAL

## 2023-05-17 VITALS
RESPIRATION RATE: 20 BRPM | HEART RATE: 102 BPM | TEMPERATURE: 97.9 F | DIASTOLIC BLOOD PRESSURE: 86 MMHG | SYSTOLIC BLOOD PRESSURE: 122 MMHG

## 2023-05-17 DIAGNOSIS — G89.18 POSTOPERATIVE PAIN: ICD-10-CM

## 2023-05-17 DIAGNOSIS — L08.9 INFECTED HEMATOMA: ICD-10-CM

## 2023-05-17 DIAGNOSIS — T14.8XXA INFECTED HEMATOMA: ICD-10-CM

## 2023-05-17 DIAGNOSIS — T81.49XA POSTOPERATIVE WOUND INFECTION: ICD-10-CM

## 2023-05-17 DIAGNOSIS — T85.79XS INFECTED PROSTHETIC MESH OF ABDOMINAL WALL, SEQUELA: ICD-10-CM

## 2023-05-17 DIAGNOSIS — E66.01 CLASS 2 SEVERE OBESITY WITH SERIOUS COMORBIDITY AND BODY MASS INDEX (BMI) OF 36.0 TO 36.9 IN ADULT, UNSPECIFIED OBESITY TYPE: ICD-10-CM

## 2023-05-17 DIAGNOSIS — T81.30XA ABDOMINAL WOUND DEHISCENCE, INITIAL ENCOUNTER: Primary | ICD-10-CM

## 2023-05-17 DIAGNOSIS — Z87.19 HISTORY OF VENTRAL HERNIA: ICD-10-CM

## 2023-05-17 PROCEDURE — 87070 CULTURE OTHR SPECIMN AEROBIC: CPT | Performed by: EMERGENCY MEDICINE

## 2023-05-17 PROCEDURE — 87205 SMEAR GRAM STAIN: CPT | Performed by: EMERGENCY MEDICINE

## 2023-05-17 RX ORDER — HYDROCODONE BITARTRATE AND ACETAMINOPHEN 5; 325 MG/1; MG/1
1 TABLET ORAL 2 TIMES DAILY PRN
Qty: 18 TABLET | Refills: 0 | Status: SHIPPED | OUTPATIENT
Start: 2023-05-17

## 2023-05-17 RX ORDER — SODIUM HYPOCHLORITE 1.25 MG/ML
10 SOLUTION TOPICAL 2 TIMES DAILY
Qty: 1000 ML | Refills: 1 | Status: SHIPPED | OUTPATIENT
Start: 2023-05-17

## 2023-05-17 NOTE — PROGRESS NOTES
Chief Complaint  Wound Check (HAD HERNIA REPAIR 2 WEEKS AGO, AND INCISION IS BLEEDING, AND NOT HEALING WELL.)    Subjective      History of Present Illness    Zachary Garzon  is a 37 y.o. male who underwent abdominal hernia repair with mesh in early April 2023.  He subsequently developed an infection and the surgeon felt like it was infected mesh.  Patient had an I&D and they removed the infected mesh and closed the wound.  Unfortunately the patient's surgeon is now out of town for 2 weeks.  He saw someone at his surgeon's office who told him he should go to the ER to see a doctor.  Labs and CT scan were done in the ED and the patient was urgently referred here to [facility].  A large fluid pocket was noted on the CT scan, labs were essentially unremarkable.  Unfortunately, patient had to  his children and had to leave the ED AMA prior to receiving his results.  I reviewed them with him. He received a prescription for clindamycin 300 mg 3 times a day for 10 days on 05/08 which he has picked up but not started.    Allergies:  Patient has no known allergies.      Current Outpatient Medications:   •  acetaminophen (TYLENOL) 500 MG tablet, Take 1 tablet by mouth Every 6 (Six) Hours As Needed for Mild Pain. (Patient not taking: Reported on 5/17/2023), Disp: 30 tablet, Rfl: 0  •  clindamycin (CLEOCIN) 300 MG capsule, Take 1 capsule by mouth 3 (Three) Times a Day. (Patient not taking: Reported on 5/17/2023), Disp: 30 capsule, Rfl: 0  •  HYDROcodone-acetaminophen (NORCO) 5-325 MG per tablet, Take 1 tablet by mouth 2 (Two) Times a Day As Needed for Severe Pain. 30 min prior to dressing changes, Disp: 18 tablet, Rfl: 0  •  ibuprofen (ADVIL,MOTRIN) 800 MG tablet, Take 1 tablet by mouth Every 6 (Six) Hours., Disp: , Rfl:   •  Lactobacillus (Florajen Acidophilus) capsule, Take 1 capsule by mouth 3 (Three) Times a Day. (Patient not taking: Reported on 5/17/2023), Disp: 30 capsule, Rfl: 0  •  penicillin v potassium (VEETID) 500 MG  tablet, penicillin V potassium 500 mg tablet  TAKE 1 TABLET BY MOUTH FOUR TIMES DAILY FOR 10 DAYS (Patient not taking: Reported on 5/17/2023), Disp: , Rfl:   •  sodium hypochlorite (DAKIN'S 1/4 STRENGTH) 0.125 % solution topical solution 0.125%, Apply 10 mL topically to the appropriate area as directed 2 (Two) Times a Day., Disp: 1000 mL, Rfl: 1    Past Medical History:   Diagnosis Date   • Hypertension      Past Surgical History:   Procedure Laterality Date   • HERNIA REPAIR     • PILONIDAL CYST / SINUS EXCISION     • SPINE SURGERY       Social History     Socioeconomic History   • Marital status: Single   Tobacco Use   • Smoking status: Former     Packs/day: 0.50     Types: Cigarettes   • Smokeless tobacco: Current   Vaping Use   • Vaping Use: Never used   Substance and Sexual Activity   • Alcohol use: Yes     Alcohol/week: 3.0 standard drinks     Types: 3 Cans of beer per week     Comment: social   • Drug use: Never   • Sexual activity: Defer           Objective     Vitals:    05/17/23 1304   BP: 122/86   BP Location: Left arm   Patient Position: Sitting   Pulse: 102   Resp: 20   Temp: 97.9 °F (36.6 °C)   TempSrc: Temporal   PainSc:   3   PainLoc: Abdomen     There is no height or weight on file to calculate BMI.    STEADI Fall Risk Assessment has not been completed.     Review of Systems     ROS:  No fevers, chills, sweats, or body aches.     I have reviewed the HPI and ROS as documented by MA/RN. Kimi Sam MD    Physical Exam     NAD  Normocephalic, atraumatic  EOMI, no scleral icterus  No respiratory distress, no cough  AAOx3, pleasant, cooperative  Obese abdomen with central periumbilical postoperative incision with dehiscence, tenderness, erythema, and bloody drainage.  Most of the staples have pulled through and there is deep tunneling and undermining noted.    See photo for details.            Result Review :  The following data was reviewed by: Kimi Sam MD on 05/17/2023:    ED note, CBC, CMP,  lactate, CT abdomen pelvis.        Incision and Drainage Procedure Note    Pre-operative Diagnosis: Surgical wound dehiscence and complex postoperative wound infection    Post-operative Diagnosis: same     Anesthesia: 6 mL 1% lidocaine without epi    Procedure Details   The procedure, risks and complications have been discussed in detail (including, but not limited to infection, bleeding, pain, need for further procedures) with the patient, and the patient has signed consent to the procedure.    Staples removed.  The skin was sterilely prepped with Betadine over the affected area in the usual fashion.  After adequate local anesthesia, I&D with a #11 blade was performed on the anterior abdominal wound.  The skin between the wounds was incised to allow for access to the entire cavity.  Bloody purulent drainage expressed, culture obtained, necrotic fat and old hematoma debrided from deep within the wound.  Wound irrigated copiously with sterile saline.      Findings: Deep cavity in anterior abdominal wall consistent with postoperative wound infection and hematoma.    EBL: 0 mL    Drains: No drains, wound packed with Dakin's wet-to-dry dressing using sterile gauze    Condition:  Stable    Complications:  Patient tolerated the procedure well and there were no complications.                 Assessment and Plan   Diagnoses and all orders for this visit:    1. Abdominal wound dehiscence, initial encounter (Primary)  -     HYDROcodone-acetaminophen (NORCO) 5-325 MG per tablet; Take 1 tablet by mouth 2 (Two) Times a Day As Needed for Severe Pain. 30 min prior to dressing changes  Dispense: 18 tablet; Refill: 0  -     sodium hypochlorite (DAKIN'S 1/4 STRENGTH) 0.125 % solution topical solution 0.125%; Apply 10 mL topically to the appropriate area as directed 2 (Two) Times a Day.  Dispense: 1000 mL; Refill: 1    2. Infected hematoma  -     Wound Culture - Wound, Abdominal Wall    3. Postoperative wound infection    4.  Postoperative pain    5. Class 2 severe obesity with serious comorbidity and body mass index (BMI) of 36.0 to 36.9 in adult, unspecified obesity type    6. History of ventral hernia    7. Infected prosthetic mesh of abdominal wall, sequela      Patient with large postoperative hematoma, wound infection, and surgical wound dehiscence.  Culture obtained today from deep within the wound.  Patient has picked up the prescription for clindamycin that he received on the eighth and has not yet taken.  He is advised to start that antibiotic and we will contact him if the antibiotic needs to be changed once the culture results are finalized.    We will have him and his wife packed the wound twice daily with Dakin's wet-to-dry dressing changes throughout the full base of the wound.  Patient was shown how to do this today.  The area is quite sensitive and tender so a prescription for additional pain medication was provided.  He can take the medication 30 minutes prior to dressing changes to help him tolerate the wound packing until the inflammation, infection, and pain improve.  Patient has no history of opiate abuse or addiction.  Akira reviewed.    Recheck 2 weeks.    Patient was given instructions and counseling regarding their condition or for health maintenance advice, as well as the wound care plan and recommendations. They understand and agree with the plan.  They will follow back up here in the clinic but are instructed to contact us in the interim should they have any new, returning, or worsening symptoms or concerns. Please see specific information pulled into the AVS if appropriate.     Dragon Dictation utilized for chart completion.    Follow Up   Return in about 2 weeks (around 5/31/2023).      Kimi Sam MD

## 2023-05-19 ENCOUNTER — TELEPHONE (OUTPATIENT)
Dept: WOUND CARE | Facility: HOSPITAL | Age: 38
End: 2023-05-19
Payer: COMMERCIAL

## 2023-05-19 NOTE — TELEPHONE ENCOUNTER
Patient had called and left message to call back.    Attempted 2x to reach patient by phone with no success.    Left message if patient is having difficulty with wound such as increase in pain, swelling, redness or fever, to go to ED.

## 2023-05-20 LAB
BACTERIA SPEC AEROBE CULT: NORMAL
GRAM STN SPEC: NORMAL

## 2023-05-21 LAB
BACTERIA SPEC AEROBE CULT: NORMAL
BACTERIA SPEC AEROBE CULT: NORMAL

## 2023-05-31 ENCOUNTER — OFFICE VISIT (OUTPATIENT)
Dept: WOUND CARE | Facility: HOSPITAL | Age: 38
End: 2023-05-31

## 2023-05-31 VITALS
HEART RATE: 98 BPM | SYSTOLIC BLOOD PRESSURE: 108 MMHG | DIASTOLIC BLOOD PRESSURE: 78 MMHG | TEMPERATURE: 98.7 F | RESPIRATION RATE: 18 BRPM

## 2023-05-31 DIAGNOSIS — T85.79XS INFECTED PROSTHETIC MESH OF ABDOMINAL WALL, SEQUELA: ICD-10-CM

## 2023-05-31 DIAGNOSIS — T81.30XD ABDOMINAL WOUND DEHISCENCE, SUBSEQUENT ENCOUNTER: Primary | ICD-10-CM

## 2023-05-31 DIAGNOSIS — Z98.890 HISTORY OF VENTRAL HERNIA REPAIR: ICD-10-CM

## 2023-05-31 DIAGNOSIS — Z87.19 HISTORY OF VENTRAL HERNIA: ICD-10-CM

## 2023-05-31 DIAGNOSIS — T81.31XD DEHISCENCE OF OPERATIVE WOUND, SUBSEQUENT ENCOUNTER: ICD-10-CM

## 2023-05-31 DIAGNOSIS — Z87.19 HISTORY OF VENTRAL HERNIA REPAIR: ICD-10-CM

## 2023-05-31 DIAGNOSIS — E66.01 CLASS 2 SEVERE OBESITY WITH SERIOUS COMORBIDITY AND BODY MASS INDEX (BMI) OF 36.0 TO 36.9 IN ADULT, UNSPECIFIED OBESITY TYPE: ICD-10-CM

## 2023-05-31 PROBLEM — T81.321A ABDOMINAL WOUND DEHISCENCE: Status: ACTIVE | Noted: 2023-05-31

## 2023-05-31 PROBLEM — T81.30XA ABDOMINAL WOUND DEHISCENCE: Status: ACTIVE | Noted: 2023-05-31

## 2023-05-31 PROCEDURE — G0463 HOSPITAL OUTPT CLINIC VISIT: HCPCS | Performed by: EMERGENCY MEDICINE

## 2023-05-31 NOTE — PROGRESS NOTES
Chief Complaint  Wound Check (Wound check to abdomen )    Subjective      History of Present Illness    Zachary Garzon  is a 37 y.o. male who underwent abdominal hernia repair with mesh in early April 2023.  He subsequently developed an infection and the surgeon felt like it was infected mesh.  Patient had an I&D and they removed the infected mesh and closed the wound.  Unfortunately the patient's surgeon is now out of town for 2 weeks.  He saw someone at his surgeon's office who told him he should go to the ER to see a doctor.  Labs and CT scan were done in the ED and the patient was urgently referred here to [facility].  A large fluid pocket was noted on the CT scan, labs were essentially unremarkable.  Unfortunately, patient had to  his children and had to leave the ED AMA prior to receiving his results.  I reviewed them with him. He received a prescription for clindamycin 300 mg 3 times a day for 10 days on 05/08.  Staples were removed from the abdominal wound revealing complete dehiscence and large deep cavity.  He was instructed to pack the wound twice daily with Dakin's wet-to-dry dressing changes and has been doing so.  He feels like the wound has been improving and also says it is much less sensitive.    Allergies:  Patient has no known allergies.      Current Outpatient Medications:   •  HYDROcodone-acetaminophen (NORCO) 5-325 MG per tablet, Take 1 tablet by mouth 2 (Two) Times a Day As Needed for Severe Pain. 30 min prior to dressing changes, Disp: 18 tablet, Rfl: 0  •  ibuprofen (ADVIL,MOTRIN) 800 MG tablet, Take 1 tablet by mouth Every 6 (Six) Hours., Disp: , Rfl:   •  sodium hypochlorite (DAKIN'S 1/4 STRENGTH) 0.125 % solution topical solution 0.125%, Apply 10 mL topically to the appropriate area as directed 2 (Two) Times a Day., Disp: 1000 mL, Rfl: 1  •  acetaminophen (TYLENOL) 500 MG tablet, Take 1 tablet by mouth Every 6 (Six) Hours As Needed for Mild Pain. (Patient not taking: Reported on  5/17/2023), Disp: 30 tablet, Rfl: 0  •  clindamycin (CLEOCIN) 300 MG capsule, Take 1 capsule by mouth 3 (Three) Times a Day. (Patient not taking: Reported on 5/17/2023), Disp: 30 capsule, Rfl: 0  •  Lactobacillus (Florajen Acidophilus) capsule, Take 1 capsule by mouth 3 (Three) Times a Day. (Patient not taking: Reported on 5/17/2023), Disp: 30 capsule, Rfl: 0  •  penicillin v potassium (VEETID) 500 MG tablet, penicillin V potassium 500 mg tablet  TAKE 1 TABLET BY MOUTH FOUR TIMES DAILY FOR 10 DAYS (Patient not taking: Reported on 5/17/2023), Disp: , Rfl:     Past Medical History:   Diagnosis Date   • Hypertension      Past Surgical History:   Procedure Laterality Date   • HERNIA REPAIR     • PILONIDAL CYST / SINUS EXCISION     • SPINE SURGERY       Social History     Socioeconomic History   • Marital status: Single   Tobacco Use   • Smoking status: Former     Packs/day: 0.50     Types: Cigarettes   • Smokeless tobacco: Current   Vaping Use   • Vaping Use: Never used   Substance and Sexual Activity   • Alcohol use: Yes     Alcohol/week: 3.0 standard drinks     Types: 3 Cans of beer per week     Comment: social   • Drug use: Never   • Sexual activity: Defer           Objective     Vitals:    05/31/23 0858   BP: 108/78   BP Location: Left arm   Patient Position: Sitting   Pulse: 98   Resp: 18   Temp: 98.7 °F (37.1 °C)   TempSrc: Temporal   PainSc: 0-No pain     There is no height or weight on file to calculate BMI.    STEADI Fall Risk Assessment has not been completed.     Review of Systems     ROS:  No fevers, chills, sweats, or body aches.     I have reviewed the HPI and ROS as documented by MA/RN. Kimi Sam MD    Physical Exam     NAD  Normocephalic, atraumatic  EOMI, no scleral icterus  No respiratory distress, no cough  AAOx3, pleasant, cooperative  Obese abdomen with central periumbilical postoperative incision with dehiscence.  Wound is showing significant improvement.  It is much smaller and there is  decreased depth.  Base exhibits healthy granulation tissue throughout.  No evidence of infection, no tenderness.    See photo for details.                Result Review :  The following data was reviewed by: Kimi Sam MD on 05/31/2023:    Prior notes and images               Assessment and Plan   Diagnoses and all orders for this visit:    1. Abdominal wound dehiscence, subsequent encounter (Primary)  -     Ambulatory Referral to [facility] For Infusion Treatment    2. Class 2 severe obesity with serious comorbidity and body mass index (BMI) of 36.0 to 36.9 in adult, unspecified obesity type    3. History of ventral hernia    4. Infected prosthetic mesh of abdominal wall, sequela    5. Dehiscence of operative wound, subsequent encounter    6. History of ventral hernia repair        Patient's postoperative abdominal wound dehiscence is significantly improved.  I feel that he would benefit from a wound VAC and the patient would like to do so for the sake of ease and expediting his healing.  We will work on ordering 1.  Since the wound is deep but getting smaller in diameter I feel that a wound VAC will be a necessary step in getting it to fill-in the rest of the way.    Recheck 3 weeks.    Patient was given instructions and counseling regarding their condition or for health maintenance advice, as well as the wound care plan and recommendations. They understand and agree with the plan.  They will follow back up here in the clinic but are instructed to contact us in the interim should they have any new, returning, or worsening symptoms or concerns. Please see specific information pulled into the AVS if appropriate.     Dragon Dictation utilized for chart completion.    Follow Up   Return in about 3 weeks (around 6/21/2023).      Kimi Sam MD

## 2023-06-05 ENCOUNTER — HOSPITAL ENCOUNTER (OUTPATIENT)
Dept: INFUSION THERAPY | Facility: HOSPITAL | Age: 38
Discharge: HOME | End: 2023-06-05
Admitting: EMERGENCY MEDICINE
Payer: COMMERCIAL

## 2023-06-05 VITALS
HEART RATE: 103 BPM | DIASTOLIC BLOOD PRESSURE: 84 MMHG | TEMPERATURE: 99.1 F | OXYGEN SATURATION: 95 % | SYSTOLIC BLOOD PRESSURE: 130 MMHG | RESPIRATION RATE: 20 BRPM

## 2023-06-05 DIAGNOSIS — T81.30XD ABDOMINAL WOUND DEHISCENCE, SUBSEQUENT ENCOUNTER: Primary | ICD-10-CM

## 2023-06-05 PROCEDURE — 97605 NEG PRS WND THER DME<=50SQCM: CPT

## 2023-06-05 NOTE — SIGNIFICANT NOTE
Wound Eval / Progress Noted    KAY Miles     Patient Name: Zachary Garzon  : 1985  MRN: [id number]  Today's Date: 2023                 Admit Date: 2023    Visit Dx:    ICD-10-CM ICD-9-CM   1. Abdominal wound dehiscence, subsequent encounter  T81.30XD V58.89     998.30       Patient Active Problem List   Diagnosis    Abdominal wound dehiscence        Past Medical History:   Diagnosis Date    Hypertension         Past Surgical History:   Procedure Laterality Date    HERNIA REPAIR      PILONIDAL CYST / SINUS EXCISION      SPINE SURGERY           Physical Assessment:  Wound 23 1412 medial abdomen Incision (Active)   Wound Image   23 1725   Dressing Appearance intact;moist drainage 23 1725   Closure None 23 1725   Base red;pink;moist;epithelialization 23 172   Red (%), Wound Tissue Color 100 23 1725   Periwound intact;warm 23 1725   Periwound Temperature warm 23 1725   Periwound Skin Turgor soft 23 1725   Edges open 23 1725   Wound Length (cm) 2 cm 23 1725   Wound Width (cm) 0.3 cm 23 1725   Wound Depth (cm) 2.2 cm 23 1725   Wound Surface Area (cm^2) 0.6 cm^2 23 1725   Wound Volume (cm^3) 1.32 cm^3 23 1725   Drainage Characteristics/Odor serous 23 1725   Drainage Amount small 23 1725   Care, Wound cleansed with;irrigated with;sterile normal saline;negative pressure wound therapy 23 1725   Dressing Care dressing applied 23 1725   Periwound Care absorptive dressing applied 23 1725        Wound Check / Follow-up:  Patient seen today for wound vac placement. He is currently being followed by the Wound clinic and had a wound vac ordered to expedite wound healing.   Patient is agreeable to the wound vac placement.   Current dressing removed. Wound cleansed with NS and gauze. Michelle-wound cleansed with NS and gauze. Wound base is pink to red and moist. Epithelialization noted to tissue  surrounding wound opening.   Cleansed and irrigated wound opening with NS.   Black foam placed within wound opening and then draped. Landing pad created for wound vac and then wound vac connected.   Suction applied, foam compressed and no leaks were present.   Tubing bolstered and secured to abdomen for added security. Trouble shooting discussed with patient to include, resealing rolled drape as well as ensuring vac remains changed and possibly changing canisters if needed.   800# on wound vac reviewed with patient for further guidance.    Impression: Abdominal wound with dehiscence     Short term goals:  Regain skin integrity. Closure by secondary intent with assistance of negative pressure therapy.    Cherri Murillo RN    6/5/2023    17:26 EDT

## 2023-06-07 ENCOUNTER — HOSPITAL ENCOUNTER (OUTPATIENT)
Dept: INFUSION THERAPY | Facility: HOSPITAL | Age: 38
Discharge: HOME | End: 2023-06-07
Admitting: EMERGENCY MEDICINE
Payer: COMMERCIAL

## 2023-06-07 VITALS
TEMPERATURE: 98.2 F | RESPIRATION RATE: 20 BRPM | HEART RATE: 77 BPM | DIASTOLIC BLOOD PRESSURE: 77 MMHG | OXYGEN SATURATION: 97 % | SYSTOLIC BLOOD PRESSURE: 121 MMHG

## 2023-06-07 DIAGNOSIS — T81.30XD ABDOMINAL WOUND DEHISCENCE, SUBSEQUENT ENCOUNTER: Primary | ICD-10-CM

## 2023-06-07 PROCEDURE — 97605 NEG PRS WND THER DME<=50SQCM: CPT

## 2023-06-07 NOTE — SIGNIFICANT NOTE
Wound Eval / Progress Noted    KAY Miles     Patient Name: Zachary Garzon  : 1985  MRN: [id number]  Today's Date: 2023                 Admit Date: 2023    Visit Dx:    ICD-10-CM ICD-9-CM   1. Abdominal wound dehiscence, subsequent encounter  T81.30XD V58.89     998.30       Patient Active Problem List   Diagnosis    Abdominal wound dehiscence        Past Medical History:   Diagnosis Date    Hypertension         Past Surgical History:   Procedure Laterality Date    HERNIA REPAIR      PILONIDAL CYST / SINUS EXCISION      SPINE SURGERY           Physical Assessment:  Wound 23 1412 medial abdomen Incision (Active)   Wound Image   23 1556   Dressing Appearance dry;intact 23 1556   Closure None 23 1556   Base red;pink;moist;epithelialization 23 1556   Periwound intact;dry 23 1556   Periwound Temperature warm 23 1556   Periwound Skin Turgor soft 23 1556   Edges open 23 1556   Wound Length (cm) 1.8 cm 23 1556   Wound Width (cm) 0.4 cm 23 1556   Wound Depth (cm) 1.8 cm 23 1556   Wound Surface Area (cm^2) 0.72 cm^2 23 1556   Wound Volume (cm^3) 1.296 cm^3 23 1556   Drainage Characteristics/Odor serosanguineous 23 155   Drainage Amount small 23 1556   Care, Wound cleansed with;irrigated with;sterile normal saline;negative pressure wound therapy 23 1556   Dressing Care dressing applied;foam;transparent film 23 1556   Periwound Care absorptive dressing applied 23 1556      Wound Check / Follow-up:  Patient seen today for wound follow-up and wound VAC dressing change. Wound VAC functioning properly without alarms. Removed dressing. Wound base is red pink and moist. Pink epithelium noted to tissue around opening of wound. Cleansed and irrigated with normal saline. Prepared periwound tissue with skin barrier wipe. Filled wound with one piece of black foam. Secured with transparent drape. Added second  piece of black foam as landing pad. Secured with transparent drape. Wound VAC connected, foam compressed, no signs of leaking. VAC is functioning properly without alarms. Tubing bolstered with gauze and and secured with transparent drape. Patient tolerating well and reports no issues or concerns. Recommending to continue current care with negative pressure wound therapy and defer care wound clinic and [facility] staff.      Impression: Abdominal wound with dehiscence. Closure by secondary intent.      Short term goals: Regain skin integrity, negative pressure wound therapy with dressing changes on MWF.      Tracey Adams RN    6/7/2023    16:01 EDT

## 2023-06-07 NOTE — SIGNIFICANT NOTE
Wound Eval / Progress Noted    KAY Miles     Patient Name: Zachary Garzon  : 1985  MRN: [id number]  Today's Date: 2023                 Admit Date: 2023    Visit Dx:    ICD-10-CM ICD-9-CM   1. Abdominal wound dehiscence, subsequent encounter  T81.30XD V58.89     998.30       Patient Active Problem List   Diagnosis    Abdominal wound dehiscence        Past Medical History:   Diagnosis Date    Hypertension         Past Surgical History:   Procedure Laterality Date    HERNIA REPAIR      PILONIDAL CYST / SINUS EXCISION      SPINE SURGERY           Physical Assessment:  Wound 23 1412 medial abdomen Incision (Active)   Wound Image   23 1556   Dressing Appearance dry;intact 23 1556   Closure None 23 1556   Base red;pink;moist;epithelialization 23 1556   Periwound intact;dry 23 1556   Periwound Temperature warm 23 1556   Periwound Skin Turgor soft 23 1556   Edges open 23 1556   Wound Length (cm) 1.8 cm 23 1556   Wound Width (cm) 0.4 cm 23 1556   Wound Depth (cm) 1.8 cm 23 1556   Wound Surface Area (cm^2) 0.72 cm^2 23 1556   Wound Volume (cm^3) 1.296 cm^3 23 1556   Drainage Characteristics/Odor serosanguineous 23 155   Drainage Amount small 23 1556   Care, Wound cleansed with;irrigated with;sterile normal saline;negative pressure wound therapy 23 1556   Dressing Care dressing applied;foam;transparent film 23 1556   Periwound Care absorptive dressing applied 23 155      Wound Check / Follow-up:  Patient seen       Impression:       Short term goals:        Tracey Adams RN    2023    15:57 EDT

## 2023-06-12 ENCOUNTER — HOSPITAL ENCOUNTER (OUTPATIENT)
Dept: INFUSION THERAPY | Facility: HOSPITAL | Age: 38
Discharge: HOME | End: 2023-06-12
Admitting: EMERGENCY MEDICINE
Payer: COMMERCIAL

## 2023-06-12 VITALS
HEART RATE: 92 BPM | OXYGEN SATURATION: 98 % | TEMPERATURE: 98.3 F | SYSTOLIC BLOOD PRESSURE: 125 MMHG | RESPIRATION RATE: 20 BRPM | DIASTOLIC BLOOD PRESSURE: 83 MMHG

## 2023-06-12 DIAGNOSIS — T81.30XD ABDOMINAL WOUND DEHISCENCE, SUBSEQUENT ENCOUNTER: Primary | ICD-10-CM

## 2023-06-12 PROCEDURE — 97605 NEG PRS WND THER DME<=50SQCM: CPT

## 2023-06-12 PROCEDURE — 97606 NEG PRS WND THER DME>50 SQCM: CPT

## 2023-06-12 NOTE — CODE DOCUMENTATION
Patient had wound vac changed. 125mm. 1 sponge inserted with a trac pad added. Covered with Clear drape.    None

## 2023-06-14 ENCOUNTER — HOSPITAL ENCOUNTER (OUTPATIENT)
Dept: INFUSION THERAPY | Facility: HOSPITAL | Age: 38
Discharge: HOME | End: 2023-06-14
Admitting: EMERGENCY MEDICINE
Payer: COMMERCIAL

## 2023-06-14 VITALS
SYSTOLIC BLOOD PRESSURE: 140 MMHG | RESPIRATION RATE: 20 BRPM | OXYGEN SATURATION: 96 % | DIASTOLIC BLOOD PRESSURE: 86 MMHG | TEMPERATURE: 98.5 F | HEART RATE: 86 BPM

## 2023-06-14 DIAGNOSIS — T81.30XD ABDOMINAL WOUND DEHISCENCE, SUBSEQUENT ENCOUNTER: Primary | ICD-10-CM

## 2023-06-14 PROCEDURE — 97606 NEG PRS WND THER DME>50 SQCM: CPT

## 2023-06-14 PROCEDURE — 97605 NEG PRS WND THER DME<=50SQCM: CPT

## 2023-06-16 ENCOUNTER — HOSPITAL ENCOUNTER (OUTPATIENT)
Dept: INFUSION THERAPY | Facility: HOSPITAL | Age: 38
Discharge: HOME | End: 2023-06-16
Payer: COMMERCIAL

## 2023-06-16 VITALS
DIASTOLIC BLOOD PRESSURE: 88 MMHG | RESPIRATION RATE: 20 BRPM | SYSTOLIC BLOOD PRESSURE: 132 MMHG | OXYGEN SATURATION: 98 % | TEMPERATURE: 98.3 F | HEART RATE: 87 BPM

## 2023-06-16 DIAGNOSIS — T81.30XD ABDOMINAL WOUND DEHISCENCE, SUBSEQUENT ENCOUNTER: Primary | ICD-10-CM

## 2023-06-16 PROCEDURE — 97605 NEG PRS WND THER DME<=50SQCM: CPT

## 2025-03-16 ENCOUNTER — HOSPITAL ENCOUNTER (EMERGENCY)
Facility: HOSPITAL | Age: 40
Discharge: HOME | End: 2025-03-16
Attending: EMERGENCY MEDICINE | Admitting: EMERGENCY MEDICINE
Payer: MEDICAID

## 2025-03-16 VITALS
DIASTOLIC BLOOD PRESSURE: 96 MMHG | SYSTOLIC BLOOD PRESSURE: 136 MMHG | HEART RATE: 72 BPM | HEIGHT: 67 IN | BODY MASS INDEX: 37.02 KG/M2 | TEMPERATURE: 97.5 F | RESPIRATION RATE: 16 BRPM | OXYGEN SATURATION: 98 % | WEIGHT: 235.89 LBS

## 2025-03-16 DIAGNOSIS — K11.21 ACUTE PAROTITIS: Primary | ICD-10-CM

## 2025-03-16 PROCEDURE — 99283 EMERGENCY DEPT VISIT LOW MDM: CPT

## 2025-03-16 PROCEDURE — 63710000001 PREDNISONE PER 5 MG: Performed by: NURSE PRACTITIONER

## 2025-03-16 RX ORDER — PREDNISONE 50 MG/1
50 TABLET ORAL DAILY
Qty: 4 TABLET | Refills: 0 | Status: SHIPPED | OUTPATIENT
Start: 2025-03-16 | End: 2025-03-20

## 2025-03-16 RX ADMIN — AMOXICILLIN AND CLAVULANATE POTASSIUM 1 TABLET: 875; 125 TABLET, FILM COATED ORAL at 03:38

## 2025-03-16 RX ADMIN — PREDNISONE 60 MG: 50 TABLET ORAL at 03:38

## 2025-03-16 NOTE — ED TRIAGE NOTES
Pt comes to the ER for right earache. Pt states that it is going down his jaw and his jaw is so swollen that he can' close his mouth.

## 2025-03-16 NOTE — Clinical Note
Psychiatric EMERGENCY ROOM  [address] [place] [address]  Phone: [phone]  Fax: [phone]    Zachary Garzon was seen and treated in our emergency department on 3/16/2025.  He may return to work on 03/17/2025.         Thank you for choosing [facility].    Yaneth Terrazas APRN

## 2025-03-16 NOTE — DISCHARGE INSTRUCTIONS
I believe this may be a blockage or an infection in your parotid gland.  Take your meds as prescribed.  Complete the antibiotics.  You may take over-the-counter acetaminophen and Motrin as needed for aches pains and fever.  You may apply ice several times a day for about 15 to 20 minutes to help with swelling and pain as well..  May help with your suck on sour candy to help increase your saliva to clear the saliva gland.  I did place a referral for the ear nose and throat office to call you this week and get you set up to follow-up with them to ensure that your symptoms are improving with rest, time, and medications.  Return to the emergency department immediately for any inability to open and close her jaw.  Any significant facial swelling or redness, any continued drainage from your left ear or any new or worse concerns.

## 2025-03-16 NOTE — ED PROVIDER NOTES
Time: 2:50 AM EDT  Date of encounter:  3/16/2025  Independent Historian/Clinical History and Information was obtained by:   Patient    History is limited by: N/A    Chief Complaint: LEFT FACIAL SWELLING AND LEFT EAR PAIN      History of Present Illness:    The patient is a 39 y.o. year old male who presents to the emergency department for evaluation of left ear pain and jaw pain.  He states that 2 days ago he was cleaning out his ears when something popped and he had pus coming out of his ear.  He states he was not having any pain or any discomfort when that happened but states shortly after that when he woke up the next morning he states that he had swelling to the left jaw and states he is having pain in that jaw.  He denies any dental issues.  He states that he is not able to open and close the jaw all the way.  He does not.  Maybe that he has some mild swelling but it is difficult to tell due to facial hair.  There is no redness surrounding the ear there is no redness to the face or jaw.  There is no drainage noted from the ear.  There is no redness in the ear canal or.  He is able to open and close his jaw completely but does appear to have discomfort when he does.  He is handling his secretions fine.  He denies ever having this before.  He reports no recent fevers.  He denies any upper respiratory symptoms of cough or congestion.      Patient Care Team  Primary Care Provider: Provider, No Known    Past Medical History:     No Known Allergies  Past Medical History:   Diagnosis Date    Hypertension      Past Surgical History:   Procedure Laterality Date    HERNIA REPAIR      PILONIDAL CYST / SINUS EXCISION      SPINE SURGERY       Family History   Problem Relation Age of Onset    No Known Problems Mother     No Known Problems Father        Home Medications:  Prior to Admission medications    Medication Sig Start Date End Date Taking? Authorizing Provider   acetaminophen (TYLENOL) 500 MG tablet Take 1 tablet by  mouth Every 6 (Six) Hours As Needed for Mild Pain.  Patient not taking: Reported on 5/17/2023 5/8/23   Alejandra Welch MD   clindamycin (CLEOCIN) 300 MG capsule Take 1 capsule by mouth 3 (Three) Times a Day. 5/8/23   Alejandra Welch MD   HYDROcodone-acetaminophen (NORCO) 5-325 MG per tablet Take 1 tablet by mouth 2 (Two) Times a Day As Needed for Severe Pain. 30 min prior to dressing changes 5/17/23   Kimi Sam MD   ibuprofen (ADVIL,MOTRIN) 800 MG tablet Take 1 tablet by mouth Every 6 (Six) Hours. 3/31/23   Sydney Keyes MD   Lactobacillus (Florajen Acidophilus) capsule Take 1 capsule by mouth 3 (Three) Times a Day.  Patient not taking: Reported on 5/17/2023 5/8/23   Alejandra Welch MD   penicillin v potassium (VEETID) 500 MG tablet     ProviderSydney MD   sodium hypochlorite (DAKIN'S 1/4 STRENGTH) 0.125 % solution topical solution 0.125% Apply 10 mL topically to the appropriate area as directed 2 (Two) Times a Day. 5/17/23   Kimi Sam MD   triamcinolone (KENALOG) 0.1 % ointment Apply 1 application topically to the appropriate area as directed 2 (Two) Times a Day. 6/21/23   Kimi Sam MD        Social History:   Social History     Tobacco Use    Smoking status: Former     Current packs/day: 0.50     Types: Cigarettes    Smokeless tobacco: Current   Vaping Use    Vaping status: Never Used   Substance Use Topics    Alcohol use: Yes     Alcohol/week: 3.0 standard drinks of alcohol     Types: 3 Cans of beer per week     Comment: social    Drug use: Never         Review of Systems:  Review of Systems   Constitutional:  Negative for chills and fever.   HENT:  Positive for ear discharge, ear pain and facial swelling. Negative for congestion, dental problem, drooling, sore throat, trouble swallowing and voice change.    Eyes:  Negative for pain.   Respiratory:  Negative for cough, chest tightness, shortness of breath and wheezing.    Cardiovascular:  Negative for chest pain.  "  Gastrointestinal:  Negative for abdominal pain, diarrhea, nausea and vomiting.   Genitourinary:  Negative for flank pain and hematuria.   Musculoskeletal:  Negative for back pain, joint swelling and neck pain.   Skin:  Negative for pallor and rash.   Neurological:  Negative for seizures and headaches.   All other systems reviewed and are negative.       Physical Exam:  /97 (BP Location: Left arm, Patient Position: Sitting)   Pulse 78   Temp 97.3 °F (36.3 °C) (Oral)   Resp 18   Ht 170.2 cm (67\")   Wt 107 kg (235 lb 14.3 oz)   SpO2 99%   BMI 36.95 kg/m²     Physical Exam  Vitals and nursing note reviewed.   Constitutional:       General: He is not in acute distress.     Appearance: Normal appearance. He is not ill-appearing or toxic-appearing.   HENT:      Head: Normocephalic and atraumatic.      Right Ear: Tympanic membrane, ear canal and external ear normal.      Left Ear: Tympanic membrane, ear canal and external ear normal.      Nose: Nose normal.      Mouth/Throat:      Mouth: Mucous membranes are moist.      Pharynx: Oropharynx is clear.   Eyes:      General: No scleral icterus.     Conjunctiva/sclera: Conjunctivae normal.      Pupils: Pupils are equal, round, and reactive to light.   Cardiovascular:      Rate and Rhythm: Normal rate and regular rhythm.      Pulses: Normal pulses.   Pulmonary:      Effort: Pulmonary effort is normal. No respiratory distress.      Breath sounds: Normal breath sounds. No wheezing.   Musculoskeletal:         General: Normal range of motion.      Cervical back: Normal range of motion and neck supple. No rigidity or tenderness.   Lymphadenopathy:      Cervical: No cervical adenopathy.   Skin:     General: Skin is warm and dry.      Capillary Refill: Capillary refill takes less than 2 seconds.      Findings: No bruising, erythema or rash.   Neurological:      General: No focal deficit present.      Mental Status: He is alert and oriented to person, place, and time. " Mental status is at baseline.   Psychiatric:         Mood and Affect: Mood normal.         Behavior: Behavior normal.          Medical Decision Making:      Comorbidities that affect care:    Hypertension    External Notes reviewed:    Previous Clinic Note: Seen at the wound care clinic on 6/21/2023 by Dr. De Luna for abdominal wound dehiscence      The following orders were placed and all results were independently analyzed by me:  Orders Placed This Encounter   Procedures    Ambulatory Referral to ENT (Otolaryngology)       Medications Given in the Emergency Department:  Medications   predniSONE (DELTASONE) tablet 60 mg (has no administration in time range)   amoxicillin-clavulanate (AUGMENTIN) 875-125 MG per tablet 1 tablet (has no administration in time range)        ED Course:         Labs:    Lab Results (last 24 hours)       ** No results found for the last 24 hours. **             Imaging:    No Radiology Exams Resulted Within Past 24 Hours      Differential Diagnosis and Discussion:    Ear Pain: Differential diagnosis includes but is not limited to this externa, otitis media, foreign body, bullous myringitis, furuncles, herpes zoster, mastoiditis, trauma, and tumors  Facial Pain/Swelling: Differential diagnosis includes but is not limited to temporal arteritis, intracranial tumors, neuralgias, dental disease, ocular disease, TMJ syndrome, salivary gland disorders, sinusitis, cluster headaches, migraines, and psychogenic.    PROCEDURES:    No orders to display       Procedures    MDM  Number of Diagnoses or Management Options  Acute parotitis: new and requires workup  Risk of Complications, Morbidity, and/or Mortality  Presenting problems: low  Diagnostic procedures: low  Management options: low    Patient Progress  Patient progress: stable       Patient Care Considerations:    LABS: I considered ordering labs, however considering the patient stable condition and complaint I did not feel any further labs were  necessary at this time.      Consultants/Shared Management Plan:    None    Social Determinants of Health:    Patient is independent, reliable, and has access to care.       Disposition and Care Coordination:    Discharged: The patient is suitable and stable for discharge with no need for consideration of admission.    I have explained the patient´s condition, diagnoses and treatment plan based on the information available to me at this time. I have answered questions and addressed any concerns. The patient has a good  understanding of the patient´s diagnosis, condition, and treatment plan as can be expected at this point. The vital signs have been stable. The patient´s condition is stable and appropriate for discharge from the emergency department.      The patient will pursue further outpatient evaluation with the primary care physician or other designated or consulting physician as outlined in the discharge instructions. They are agreeable to this plan of care and follow-up instructions have been explained in detail. The patient has received these instructions in written format and has expressed an understanding of the discharge instructions. The patient is aware that any significant change in condition or worsening of symptoms should prompt an immediate return to this or the closest emergency department or call to 911.  I have explained discharge medications and the need for follow up with the patient/caretakers. This was also printed in the discharge instructions. Patient was discharged with the following medications and follow up:      Medication List        New Prescriptions      amoxicillin-clavulanate 875-125 MG per tablet  Commonly known as: AUGMENTIN  Take 1 tablet by mouth 2 (Two) Times a Day for 10 days.     predniSONE 50 MG tablet  Commonly known as: DELTASONE  Take 1 tablet by mouth Daily for 4 days.               Where to Get Your Medications        These medications were sent to [facility]/pharmacy #[id number]  - [place] - [address] - [phone]  - [phone] FX  [address] [place] [address]      Hours: 24-hours Phone: [phone]   amoxicillin-clavulanate 875-125 MG per tablet  predniSONE 50 MG tablet      Del Spain MD  [address] [place] [address] [id number] [phone]      FOR FOLLOW UP       Final diagnoses:   Acute parotitis        ED Disposition       ED Disposition   Discharge    Condition   Stable    Comment   --               This medical record created using voice recognition software.             Yaneth Terrazas, APRN  03/16/25 0336

## 2025-04-25 ENCOUNTER — HOSPITAL ENCOUNTER (EMERGENCY)
Facility: HOSPITAL | Age: 40
Discharge: LEFT BEFORE DISCHARGE | End: 2025-04-26
Attending: EMERGENCY MEDICINE
Payer: COMMERCIAL

## 2025-04-25 VITALS
WEIGHT: 239.64 LBS | OXYGEN SATURATION: 94 % | RESPIRATION RATE: 18 BRPM | HEIGHT: 67 IN | DIASTOLIC BLOOD PRESSURE: 93 MMHG | SYSTOLIC BLOOD PRESSURE: 133 MMHG | HEART RATE: 93 BPM | TEMPERATURE: 98.3 F | BODY MASS INDEX: 37.61 KG/M2

## 2025-04-25 DIAGNOSIS — H57.11 EYE PAIN, RIGHT: Primary | ICD-10-CM

## 2025-04-25 DIAGNOSIS — L03.213 PERIORBITAL CELLULITIS OF RIGHT EYE: ICD-10-CM

## 2025-04-25 DIAGNOSIS — L02.01 FACIAL ABSCESS: ICD-10-CM

## 2025-04-25 LAB
ALBUMIN SERPL-MCNC: 4.4 G/DL (ref 3.5–5.2)
ALBUMIN/GLOB SERPL: 1.5 G/DL
ALP SERPL-CCNC: 80 U/L (ref 39–117)
ALT SERPL W P-5'-P-CCNC: 20 U/L (ref 1–41)
ANION GAP SERPL CALCULATED.3IONS-SCNC: 10.1 MMOL/L (ref 5–15)
AST SERPL-CCNC: 20 U/L (ref 1–40)
BASOPHILS # BLD AUTO: 0.06 10*3/MM3 (ref 0–0.2)
BASOPHILS NFR BLD AUTO: 0.5 % (ref 0–1.5)
BILIRUB SERPL-MCNC: 0.4 MG/DL (ref 0–1.2)
BUN SERPL-MCNC: 10 MG/DL (ref 6–20)
BUN/CREAT SERPL: 10.8 (ref 7–25)
CALCIUM SPEC-SCNC: 9.1 MG/DL (ref 8.6–10.5)
CHLORIDE SERPL-SCNC: 108 MMOL/L (ref 98–107)
CO2 SERPL-SCNC: 24.9 MMOL/L (ref 22–29)
CREAT SERPL-MCNC: 0.93 MG/DL (ref 0.76–1.27)
DEPRECATED RDW RBC AUTO: 41.1 FL (ref 37–54)
EGFRCR SERPLBLD CKD-EPI 2021: 107.1 ML/MIN/1.73
EOSINOPHIL # BLD AUTO: 0.39 10*3/MM3 (ref 0–0.4)
EOSINOPHIL NFR BLD AUTO: 3.2 % (ref 0.3–6.2)
ERYTHROCYTE [DISTWIDTH] IN BLOOD BY AUTOMATED COUNT: 12.5 % (ref 12.3–15.4)
GLOBULIN UR ELPH-MCNC: 3 GM/DL
GLUCOSE SERPL-MCNC: 89 MG/DL (ref 65–99)
HCT VFR BLD AUTO: 45.3 % (ref 37.5–51)
HGB BLD-MCNC: 15.9 G/DL (ref 13–17.7)
HOLD SPECIMEN: NORMAL
HOLD SPECIMEN: NORMAL
IMM GRANULOCYTES # BLD AUTO: 0.05 10*3/MM3 (ref 0–0.05)
IMM GRANULOCYTES NFR BLD AUTO: 0.4 % (ref 0–0.5)
LYMPHOCYTES # BLD AUTO: 2.54 10*3/MM3 (ref 0.7–3.1)
LYMPHOCYTES NFR BLD AUTO: 20.7 % (ref 19.6–45.3)
MCH RBC QN AUTO: 31.6 PG (ref 26.6–33)
MCHC RBC AUTO-ENTMCNC: 35.1 G/DL (ref 31.5–35.7)
MCV RBC AUTO: 90.1 FL (ref 79–97)
MONOCYTES # BLD AUTO: 0.95 10*3/MM3 (ref 0.1–0.9)
MONOCYTES NFR BLD AUTO: 7.7 % (ref 5–12)
NEUTROPHILS NFR BLD AUTO: 67.5 % (ref 42.7–76)
NEUTROPHILS NFR BLD AUTO: 8.31 10*3/MM3 (ref 1.7–7)
NRBC BLD AUTO-RTO: 0 /100 WBC (ref 0–0.2)
PLATELET # BLD AUTO: 317 10*3/MM3 (ref 140–450)
PMV BLD AUTO: 9.5 FL (ref 6–12)
POTASSIUM SERPL-SCNC: 4.1 MMOL/L (ref 3.5–5.2)
PROT SERPL-MCNC: 7.4 G/DL (ref 6–8.5)
RBC # BLD AUTO: 5.03 10*6/MM3 (ref 4.14–5.8)
SODIUM SERPL-SCNC: 143 MMOL/L (ref 136–145)
WBC NRBC COR # BLD AUTO: 12.3 10*3/MM3 (ref 3.4–10.8)
WHOLE BLOOD HOLD COAG: NORMAL
WHOLE BLOOD HOLD SPECIMEN: NORMAL

## 2025-04-25 PROCEDURE — 85025 COMPLETE CBC W/AUTO DIFF WBC: CPT

## 2025-04-25 PROCEDURE — 80053 COMPREHEN METABOLIC PANEL: CPT

## 2025-04-25 PROCEDURE — 99283 EMERGENCY DEPT VISIT LOW MDM: CPT

## 2025-04-26 ENCOUNTER — APPOINTMENT (OUTPATIENT)
Dept: CT IMAGING | Facility: HOSPITAL | Age: 40
End: 2025-04-26
Payer: COMMERCIAL

## 2025-04-26 NOTE — DISCHARGE INSTRUCTIONS
I have sent Augmentin to your pharmacy. I do recommend that you start this medication to help with this area of infection.    Follow up with your Primary Care Provider in 3-5 days especially if symptoms worsen.    Return to the Emergency Department if you develop any uncontrollable fever, intractable pain, nausea, vomiting.

## 2025-04-26 NOTE — ED PROVIDER NOTES
Time: 12:00 AM EDT  Date of encounter:  4/25/2025  Independent Historian/Clinical History and Information was obtained by:   Patient    History is limited by: N/A    Chief Complaint: Right eye vision      History of Present Illness:  Patient is a 39 y.o. year old male who presents to the emergency department for evaluation of right eye pain. States that he noticed a spot on the right side of his nose. States that symptoms started two days ago. He thought that this was a pimple. Reports vision changes. He was seen at [facility] and was recommend to come to the ED for further evaluation. They are worried that he has orbital cellulitis. States that he wants it cut open.    Patient Care Team  Primary Care Provider: Provider, No Known    Past Medical History:     No Known Allergies  History reviewed. No pertinent past medical history.  Past Surgical History:   Procedure Laterality Date    HERNIA REPAIR      PILONIDAL CYST / SINUS EXCISION      SPINE SURGERY       Family History   Problem Relation Age of Onset    No Known Problems Mother     No Known Problems Father        Home Medications:  Prior to Admission medications    Not on File        Social History:   Social History     Tobacco Use    Smoking status: Former     Current packs/day: 0.50     Types: Cigarettes    Smokeless tobacco: Former   Vaping Use    Vaping status: Every Day    Substances: Nicotine    Devices: Disposable    Passive vaping exposure: Yes   Substance Use Topics    Alcohol use: Yes     Alcohol/week: 3.0 standard drinks of alcohol     Types: 3 Cans of beer per week     Comment: social    Drug use: Never         Review of Systems:  Review of Systems   Constitutional:  Negative for chills and fever.   HENT:  Negative for congestion and ear pain.    Eyes:  Positive for visual disturbance. Negative for pain.   Respiratory:  Negative for cough and shortness of breath.    Cardiovascular:  Negative for chest pain.   Gastrointestinal:  Negative for abdominal pain,  "diarrhea, nausea and vomiting.   Genitourinary:  Negative for dysuria and hematuria.   Musculoskeletal:  Negative for myalgias.   Skin:  Negative for rash.   Neurological:  Negative for dizziness and headaches.        Physical Exam:  /93 (BP Location: Right arm, Patient Position: Lying)   Pulse 93   Temp 98.3 °F (36.8 °C) (Oral)   Resp 18   Ht 170.2 cm (67\")   Wt 109 kg (239 lb 10.2 oz)   SpO2 94%   BMI 37.53 kg/m²     Physical Exam  Constitutional:       Appearance: Normal appearance.   HENT:      Head: Normocephalic.     Eyes:      Extraocular Movements: Extraocular movements intact.      Conjunctiva/sclera: Conjunctivae normal.      Pupils: Pupils are equal, round, and reactive to light.   Pulmonary:      Effort: Pulmonary effort is normal.   Abdominal:      General: There is no distension.   Skin:     General: Skin is warm.      Coloration: Skin is not cyanotic.   Neurological:      Mental Status: He is alert and oriented to person, place, and time.   Psychiatric:         Attention and Perception: Attention and perception normal.         Mood and Affect: Mood normal.                    Medical Decision Making:      Comorbidities that affect care:    None    External Notes reviewed:    Previous Clinic Note: Reviewed clinic note on 4/25/25      The following orders were placed and all results were independently analyzed by me:  Orders Placed This Encounter   Procedures    Deforest Draw    Comprehensive Metabolic Panel    CBC Auto Differential    CBC & Differential    Green Top (Gel)    Lavender Top    Gold Top - SST    Light Blue Top       Medications Given in the Emergency Department:  Medications - No data to display     ED Course:    ED Course as of 04/27/25 0102   Sat Apr 26, 2025   0005 CBC & Differential(!)  CBC shows a mildly elevated WBC of 12.30. Rest of the CBC is unremarkable. [MV]   0006 Comprehensive Metabolic Panel(!)  The patient´s CMP that was reviewed and interpreted by me shows no " abnormalities of critical concern. Of note, the patient´s sodium and potassium are acceptable. The patient´s liver enzymes are unremarkable. The patient´s renal function (creatinine) is preserved. The patient has a normal anion gap.    [MV]   0006 Ordered a CT Facial with contrast to further assess the lesion. Patient refused. States that he does not need this. States that he just wants it cut. Discussed with the patient that I cannot dimitrios this area unless I know the depth of the infection. He reports that he is 100% sure that it's not his eye. States that he wants to just go home and he will cut it himself. I again recommended that he does not do it himself. Patient wants to leave AMA. Patient is in the right mind and understands the risks of leaving AMA. [MV]      ED Course User Index  [MV] Junior Guerrier PA       Labs:    Lab Results (last 24 hours)       ** No results found for the last 24 hours. **             Imaging:    No Radiology Exams Resulted Within Past 24 Hours      Differential Diagnosis and Discussion:    Eye Pain/Blurred Vision: Differential diagnosis includes but is not limited to dacryocystitis, hordeolum, chalazion, periorbital cellulitis, cavernous sinus thrombosis, blepharitis, and glaucoma.    PROCEDURES:    Labs were collected in the emergency department and all labs were reviewed and interpreted by me.    No orders to display       Procedures    MDM  Risk of Complications, Morbidity, and/or Mortality  Presenting problems: moderate  Diagnostic procedures: low  Management options: low    Patient Progress  Patient progress: stable                      Patient Care Considerations:    CT Facial: Considered ordering CT Facial but patient refused.      Consultants/Shared Management Plan:    None    Social Determinants of Health:    Patient is independent, reliable, and has access to care.       Disposition and Care Coordination:    AMA: Patient has decided to leave our facility against medical  advice. I have assessed the patient´s ability to make an informed decision and it is my opinion at this time that the patient has the medical decision-making capacity to comprehend information regarding current medical condition and appreciates the impact of the disease or condition and the consequences of various options for treatment, including foregoing treatment. The patient possesses the ability to evaluate all treatment options, compare the risks and benefits of each option, communicate choice in a consistent manner over time, and is able to make rational choices. I have explained to the patient the further testing, treatment, and evaluation I would like to perform during the current emergency department visit as well as any possible alternatives that could be accomplished in a timely manner. I have outlined the possible risks of forgoing any all of these interventions and the patient understands and acknowledges that the decision to leave may result in undesirable consequences such as death, permanent disability, and/or loss of current lifestyle. Even though leaving AMA is not ideal, I have instructed the patient to follow any discharge instructions given, take any medications prescribed, and resume care as soon as possible with any other provider. Additionally, we clearly stated that the patient is welcome to return it anytime to continue care at our facility.        Final diagnoses:   Eye pain, right   Facial abscess   Periorbital cellulitis of right eye        ED Disposition       ED Disposition   AMA    Condition   --    Comment   --               This medical record created using voice recognition software.             Junior Guerrier PA  04/27/25 0102

## 2025-04-26 NOTE — ED PROVIDER NOTES
"SHARED VISIT NOTE:    Patient is 39 y.o. year old male that presents to the ED for evaluation of eye pain.     Physical Exam    ED Course:    /93 (BP Location: Right arm, Patient Position: Lying)   Pulse 93   Temp 98.3 °F (36.8 °C) (Oral)   Resp 18   Ht 170.2 cm (67\")   Wt 109 kg (239 lb 10.2 oz)   SpO2 94%   BMI 37.53 kg/m²   Results for orders placed or performed during the hospital encounter of 04/25/25   Comprehensive Metabolic Panel    Collection Time: 04/25/25 11:28 PM    Specimen: Blood   Result Value Ref Range    Glucose 89 65 - 99 mg/dL    BUN 10 6 - 20 mg/dL    Creatinine 0.93 0.76 - 1.27 mg/dL    Sodium 143 136 - 145 mmol/L    Potassium 4.1 3.5 - 5.2 mmol/L    Chloride 108 (H) 98 - 107 mmol/L    CO2 24.9 22.0 - 29.0 mmol/L    Calcium 9.1 8.6 - 10.5 mg/dL    Total Protein 7.4 6.0 - 8.5 g/dL    Albumin 4.4 3.5 - 5.2 g/dL    ALT (SGPT) 20 1 - 41 U/L    AST (SGOT) 20 1 - 40 U/L    Alkaline Phosphatase 80 39 - 117 U/L    Total Bilirubin 0.4 0.0 - 1.2 mg/dL    Globulin 3.0 gm/dL    A/G Ratio 1.5 g/dL    BUN/Creatinine Ratio 10.8 7.0 - 25.0    Anion Gap 10.1 5.0 - 15.0 mmol/L    eGFR 107.1 >60.0 mL/min/1.73   CBC Auto Differential    Collection Time: 04/25/25 11:28 PM    Specimen: Blood   Result Value Ref Range    WBC 12.30 (H) 3.40 - 10.80 10*3/mm3    RBC 5.03 4.14 - 5.80 10*6/mm3    Hemoglobin 15.9 13.0 - 17.7 g/dL    Hematocrit 45.3 37.5 - 51.0 %    MCV 90.1 79.0 - 97.0 fL    MCH 31.6 26.6 - 33.0 pg    MCHC 35.1 31.5 - 35.7 g/dL    RDW 12.5 12.3 - 15.4 %    RDW-SD 41.1 37.0 - 54.0 fl    MPV 9.5 6.0 - 12.0 fL    Platelets 317 140 - 450 10*3/mm3    Neutrophil % 67.5 42.7 - 76.0 %    Lymphocyte % 20.7 19.6 - 45.3 %    Monocyte % 7.7 5.0 - 12.0 %    Eosinophil % 3.2 0.3 - 6.2 %    Basophil % 0.5 0.0 - 1.5 %    Immature Grans % 0.4 0.0 - 0.5 %    Neutrophils, Absolute 8.31 (H) 1.70 - 7.00 10*3/mm3    Lymphocytes, Absolute 2.54 0.70 - 3.10 10*3/mm3    Monocytes, Absolute 0.95 (H) 0.10 - 0.90 10*3/mm3 "    Eosinophils, Absolute 0.39 0.00 - 0.40 10*3/mm3    Basophils, Absolute 0.06 0.00 - 0.20 10*3/mm3    Immature Grans, Absolute 0.05 0.00 - 0.05 10*3/mm3    nRBC 0.0 0.0 - 0.2 /100 WBC   Green Top (Gel)    Collection Time: 04/25/25 11:28 PM   Result Value Ref Range    Extra Tube Hold for add-ons.    Lavender Top    Collection Time: 04/25/25 11:28 PM   Result Value Ref Range    Extra Tube hold for add-on    Gold Top - SST    Collection Time: 04/25/25 11:28 PM   Result Value Ref Range    Extra Tube Hold for add-ons.    Light Blue Top    Collection Time: 04/25/25 11:28 PM   Result Value Ref Range    Extra Tube Hold for add-ons.      Medications - No data to display  No results found.    MDM:    Procedures              SHARED VISIT ATTESTATION:    This visit was performed by both myself and an APC.  I performed the substantive portion of the medical decision making. The management plan was made or approved by me, and I take responsibility for patient management.           Yayo Lee MD  06:23 EDT  04/26/25         Yayo Lee MD  04/26/25 0623